# Patient Record
Sex: FEMALE | Race: WHITE | Employment: OTHER | URBAN - METROPOLITAN AREA
[De-identification: names, ages, dates, MRNs, and addresses within clinical notes are randomized per-mention and may not be internally consistent; named-entity substitution may affect disease eponyms.]

---

## 2017-03-01 ENCOUNTER — TRANSCRIBE ORDERS (OUTPATIENT)
Dept: ADMINISTRATIVE | Age: 82
End: 2017-03-01

## 2017-03-01 ENCOUNTER — APPOINTMENT (OUTPATIENT)
Dept: LAB | Age: 82
End: 2017-03-01
Payer: COMMERCIAL

## 2017-03-01 DIAGNOSIS — R73.01 IMPAIRED FASTING GLUCOSE: ICD-10-CM

## 2017-03-01 DIAGNOSIS — R53.81 OTHER MALAISE AND FATIGUE: ICD-10-CM

## 2017-03-01 DIAGNOSIS — R53.81 OTHER MALAISE AND FATIGUE: Primary | ICD-10-CM

## 2017-03-01 DIAGNOSIS — R53.83 OTHER MALAISE AND FATIGUE: ICD-10-CM

## 2017-03-01 DIAGNOSIS — R53.83 OTHER MALAISE AND FATIGUE: Primary | ICD-10-CM

## 2017-03-01 LAB
25(OH)D3 SERPL-MCNC: 42.8 NG/ML (ref 30–100)
ALBUMIN SERPL BCP-MCNC: 3.5 G/DL (ref 3.5–5)
ALP SERPL-CCNC: 70 U/L (ref 46–116)
ALT SERPL W P-5'-P-CCNC: 20 U/L (ref 12–78)
AMORPH PHOS CRY URNS QL MICRO: ABNORMAL /HPF
ANION GAP SERPL CALCULATED.3IONS-SCNC: 6 MMOL/L (ref 4–13)
AST SERPL W P-5'-P-CCNC: 14 U/L (ref 5–45)
BACTERIA UR QL AUTO: ABNORMAL /HPF
BASOPHILS # BLD AUTO: 0.02 THOUSANDS/ΜL (ref 0–0.1)
BASOPHILS NFR BLD AUTO: 0 % (ref 0–1)
BILIRUB SERPL-MCNC: 0.6 MG/DL (ref 0.2–1)
BILIRUB UR QL STRIP: NEGATIVE
BUN SERPL-MCNC: 14 MG/DL (ref 5–25)
CALCIUM SERPL-MCNC: 9 MG/DL (ref 8.3–10.1)
CHLORIDE SERPL-SCNC: 109 MMOL/L (ref 100–108)
CHOLEST SERPL-MCNC: 260 MG/DL (ref 50–200)
CLARITY UR: ABNORMAL
CO2 SERPL-SCNC: 28 MMOL/L (ref 21–32)
COLOR UR: YELLOW
CREAT SERPL-MCNC: 0.74 MG/DL (ref 0.6–1.3)
EOSINOPHIL # BLD AUTO: 0.24 THOUSAND/ΜL (ref 0–0.61)
EOSINOPHIL NFR BLD AUTO: 4 % (ref 0–6)
ERYTHROCYTE [DISTWIDTH] IN BLOOD BY AUTOMATED COUNT: 14.4 % (ref 11.6–15.1)
EST. AVERAGE GLUCOSE BLD GHB EST-MCNC: 114 MG/DL
GFR SERPL CREATININE-BSD FRML MDRD: >60 ML/MIN/1.73SQ M
GLUCOSE SERPL-MCNC: 92 MG/DL (ref 65–140)
GLUCOSE UR STRIP-MCNC: NEGATIVE MG/DL
HBA1C MFR BLD: 5.6 % (ref 4.2–6.3)
HCT VFR BLD AUTO: 40.4 % (ref 34.8–46.1)
HDLC SERPL-MCNC: 96 MG/DL (ref 40–60)
HGB BLD-MCNC: 13.6 G/DL (ref 11.5–15.4)
HGB UR QL STRIP.AUTO: ABNORMAL
KETONES UR STRIP-MCNC: NEGATIVE MG/DL
LDLC SERPL CALC-MCNC: 145 MG/DL (ref 0–100)
LEUKOCYTE ESTERASE UR QL STRIP: ABNORMAL
LYMPHOCYTES # BLD AUTO: 2.1 THOUSANDS/ΜL (ref 0.6–4.47)
LYMPHOCYTES NFR BLD AUTO: 35 % (ref 14–44)
MCH RBC QN AUTO: 30 PG (ref 26.8–34.3)
MCHC RBC AUTO-ENTMCNC: 33.7 G/DL (ref 31.4–37.4)
MCV RBC AUTO: 89 FL (ref 82–98)
MONOCYTES # BLD AUTO: 0.53 THOUSAND/ΜL (ref 0.17–1.22)
MONOCYTES NFR BLD AUTO: 9 % (ref 4–12)
NEUTROPHILS # BLD AUTO: 3.03 THOUSANDS/ΜL (ref 1.85–7.62)
NEUTS SEG NFR BLD AUTO: 52 % (ref 43–75)
NITRITE UR QL STRIP: NEGATIVE
NON-SQ EPI CELLS URNS QL MICRO: ABNORMAL /HPF
NRBC BLD AUTO-RTO: 0 /100 WBCS
PH UR STRIP.AUTO: 7.5 [PH] (ref 4.5–8)
PLATELET # BLD AUTO: 349 THOUSANDS/UL (ref 149–390)
PMV BLD AUTO: 10 FL (ref 8.9–12.7)
POTASSIUM SERPL-SCNC: 3.5 MMOL/L (ref 3.5–5.3)
PROT SERPL-MCNC: 7.1 G/DL (ref 6.4–8.2)
PROT UR STRIP-MCNC: NEGATIVE MG/DL
RBC # BLD AUTO: 4.54 MILLION/UL (ref 3.81–5.12)
RBC #/AREA URNS AUTO: ABNORMAL /HPF
SODIUM SERPL-SCNC: 143 MMOL/L (ref 136–145)
SP GR UR STRIP.AUTO: 1.01 (ref 1–1.03)
TRIGL SERPL-MCNC: 93 MG/DL
TSH SERPL DL<=0.05 MIU/L-ACNC: 2.89 UIU/ML (ref 0.36–3.74)
UROBILINOGEN UR QL STRIP.AUTO: 0.2 E.U./DL
WBC # BLD AUTO: 5.93 THOUSAND/UL (ref 4.31–10.16)
WBC #/AREA URNS AUTO: ABNORMAL /HPF

## 2017-03-01 PROCEDURE — 36415 COLL VENOUS BLD VENIPUNCTURE: CPT

## 2017-03-01 PROCEDURE — 80061 LIPID PANEL: CPT

## 2017-03-01 PROCEDURE — 81001 URINALYSIS AUTO W/SCOPE: CPT | Performed by: INTERNAL MEDICINE

## 2017-03-01 PROCEDURE — 80053 COMPREHEN METABOLIC PANEL: CPT

## 2017-03-01 PROCEDURE — 83036 HEMOGLOBIN GLYCOSYLATED A1C: CPT

## 2017-03-01 PROCEDURE — 84443 ASSAY THYROID STIM HORMONE: CPT

## 2017-03-01 PROCEDURE — 82306 VITAMIN D 25 HYDROXY: CPT

## 2017-03-01 PROCEDURE — 87086 URINE CULTURE/COLONY COUNT: CPT | Performed by: INTERNAL MEDICINE

## 2017-03-01 PROCEDURE — 85025 COMPLETE CBC W/AUTO DIFF WBC: CPT

## 2017-03-02 LAB — BACTERIA UR CULT: NORMAL

## 2017-06-05 ENCOUNTER — TRANSCRIBE ORDERS (OUTPATIENT)
Dept: ADMINISTRATIVE | Facility: HOSPITAL | Age: 82
End: 2017-06-05

## 2018-10-01 ENCOUNTER — APPOINTMENT (OUTPATIENT)
Dept: LAB | Age: 83
End: 2018-10-01
Payer: COMMERCIAL

## 2018-10-01 ENCOUNTER — TRANSCRIBE ORDERS (OUTPATIENT)
Dept: ADMINISTRATIVE | Age: 83
End: 2018-10-01

## 2018-10-01 ENCOUNTER — APPOINTMENT (OUTPATIENT)
Dept: RADIOLOGY | Age: 83
End: 2018-10-01
Payer: COMMERCIAL

## 2018-10-01 DIAGNOSIS — M25.511 CHRONIC RIGHT SHOULDER PAIN: ICD-10-CM

## 2018-10-01 DIAGNOSIS — G89.29 CHRONIC LEFT SHOULDER PAIN: ICD-10-CM

## 2018-10-01 DIAGNOSIS — E78.2 MIXED HYPERLIPIDEMIA: ICD-10-CM

## 2018-10-01 DIAGNOSIS — M25.512 CHRONIC LEFT SHOULDER PAIN: ICD-10-CM

## 2018-10-01 DIAGNOSIS — G89.29 CHRONIC RIGHT SHOULDER PAIN: ICD-10-CM

## 2018-10-01 DIAGNOSIS — E55.9 AVITAMINOSIS D: ICD-10-CM

## 2018-10-01 DIAGNOSIS — E78.2 MIXED HYPERLIPIDEMIA: Primary | ICD-10-CM

## 2018-10-01 LAB
25(OH)D3 SERPL-MCNC: 81.5 NG/ML (ref 30–100)
ALBUMIN SERPL BCP-MCNC: 3.5 G/DL (ref 3.5–5)
ALP SERPL-CCNC: 88 U/L (ref 46–116)
ALT SERPL W P-5'-P-CCNC: 18 U/L (ref 12–78)
ANION GAP SERPL CALCULATED.3IONS-SCNC: 5 MMOL/L (ref 4–13)
AST SERPL W P-5'-P-CCNC: 13 U/L (ref 5–45)
BASOPHILS # BLD AUTO: 0.06 THOUSANDS/ΜL (ref 0–0.1)
BASOPHILS NFR BLD AUTO: 1 % (ref 0–1)
BILIRUB SERPL-MCNC: 0.7 MG/DL (ref 0.2–1)
BUN SERPL-MCNC: 13 MG/DL (ref 5–25)
CALCIUM SERPL-MCNC: 9 MG/DL (ref 8.3–10.1)
CHLORIDE SERPL-SCNC: 105 MMOL/L (ref 100–108)
CHOLEST SERPL-MCNC: 217 MG/DL (ref 50–200)
CO2 SERPL-SCNC: 28 MMOL/L (ref 21–32)
CREAT SERPL-MCNC: 0.78 MG/DL (ref 0.6–1.3)
EOSINOPHIL # BLD AUTO: 0.29 THOUSAND/ΜL (ref 0–0.61)
EOSINOPHIL NFR BLD AUTO: 6 % (ref 0–6)
ERYTHROCYTE [DISTWIDTH] IN BLOOD BY AUTOMATED COUNT: 13.3 % (ref 11.6–15.1)
GFR SERPL CREATININE-BSD FRML MDRD: 72 ML/MIN/1.73SQ M
GLUCOSE P FAST SERPL-MCNC: 94 MG/DL (ref 65–99)
HCT VFR BLD AUTO: 43.6 % (ref 34.8–46.1)
HDLC SERPL-MCNC: 89 MG/DL (ref 40–60)
HGB BLD-MCNC: 13.7 G/DL (ref 11.5–15.4)
IMM GRANULOCYTES # BLD AUTO: 0.01 THOUSAND/UL (ref 0–0.2)
IMM GRANULOCYTES NFR BLD AUTO: 0 % (ref 0–2)
LDLC SERPL CALC-MCNC: 113 MG/DL (ref 0–100)
LYMPHOCYTES # BLD AUTO: 2.03 THOUSANDS/ΜL (ref 0.6–4.47)
LYMPHOCYTES NFR BLD AUTO: 39 % (ref 14–44)
MCH RBC QN AUTO: 29.1 PG (ref 26.8–34.3)
MCHC RBC AUTO-ENTMCNC: 31.4 G/DL (ref 31.4–37.4)
MCV RBC AUTO: 93 FL (ref 82–98)
MONOCYTES # BLD AUTO: 0.47 THOUSAND/ΜL (ref 0.17–1.22)
MONOCYTES NFR BLD AUTO: 9 % (ref 4–12)
NEUTROPHILS # BLD AUTO: 2.4 THOUSANDS/ΜL (ref 1.85–7.62)
NEUTS SEG NFR BLD AUTO: 45 % (ref 43–75)
NONHDLC SERPL-MCNC: 128 MG/DL
NRBC BLD AUTO-RTO: 0 /100 WBCS
PLATELET # BLD AUTO: 324 THOUSANDS/UL (ref 149–390)
PMV BLD AUTO: 9.7 FL (ref 8.9–12.7)
POTASSIUM SERPL-SCNC: 3.5 MMOL/L (ref 3.5–5.3)
PROT SERPL-MCNC: 7.4 G/DL (ref 6.4–8.2)
RBC # BLD AUTO: 4.71 MILLION/UL (ref 3.81–5.12)
SODIUM SERPL-SCNC: 138 MMOL/L (ref 136–145)
TRIGL SERPL-MCNC: 76 MG/DL
TSH SERPL DL<=0.05 MIU/L-ACNC: 2.56 UIU/ML (ref 0.36–3.74)
WBC # BLD AUTO: 5.26 THOUSAND/UL (ref 4.31–10.16)

## 2018-10-01 PROCEDURE — 85025 COMPLETE CBC W/AUTO DIFF WBC: CPT

## 2018-10-01 PROCEDURE — 80061 LIPID PANEL: CPT

## 2018-10-01 PROCEDURE — 73030 X-RAY EXAM OF SHOULDER: CPT

## 2018-10-01 PROCEDURE — 84443 ASSAY THYROID STIM HORMONE: CPT

## 2018-10-01 PROCEDURE — 36415 COLL VENOUS BLD VENIPUNCTURE: CPT

## 2018-10-01 PROCEDURE — 80053 COMPREHEN METABOLIC PANEL: CPT

## 2018-10-01 PROCEDURE — 82306 VITAMIN D 25 HYDROXY: CPT

## 2019-10-17 ENCOUNTER — TRANSCRIBE ORDERS (OUTPATIENT)
Dept: ADMINISTRATIVE | Age: 84
End: 2019-10-17

## 2019-10-17 ENCOUNTER — APPOINTMENT (OUTPATIENT)
Dept: LAB | Age: 84
End: 2019-10-17
Payer: COMMERCIAL

## 2019-10-17 DIAGNOSIS — E55.9 AVITAMINOSIS D: ICD-10-CM

## 2019-10-17 DIAGNOSIS — R73.01 IMPAIRED FASTING GLUCOSE: ICD-10-CM

## 2019-10-17 DIAGNOSIS — R73.01 IMPAIRED FASTING GLUCOSE: Primary | ICD-10-CM

## 2019-10-17 LAB
25(OH)D3 SERPL-MCNC: 63.6 NG/ML (ref 30–100)
ALBUMIN SERPL BCP-MCNC: 3.8 G/DL (ref 3.5–5)
ALP SERPL-CCNC: 92 U/L (ref 46–116)
ALT SERPL W P-5'-P-CCNC: 18 U/L (ref 12–78)
ANION GAP SERPL CALCULATED.3IONS-SCNC: 4 MMOL/L (ref 4–13)
AST SERPL W P-5'-P-CCNC: 13 U/L (ref 5–45)
BASOPHILS # BLD AUTO: 0.05 THOUSANDS/ΜL (ref 0–0.1)
BASOPHILS NFR BLD AUTO: 1 % (ref 0–1)
BILIRUB SERPL-MCNC: 0.62 MG/DL (ref 0.2–1)
BUN SERPL-MCNC: 15 MG/DL (ref 5–25)
CALCIUM SERPL-MCNC: 9.5 MG/DL (ref 8.3–10.1)
CHLORIDE SERPL-SCNC: 108 MMOL/L (ref 100–108)
CHOLEST SERPL-MCNC: 260 MG/DL (ref 50–200)
CO2 SERPL-SCNC: 28 MMOL/L (ref 21–32)
CREAT SERPL-MCNC: 0.71 MG/DL (ref 0.6–1.3)
EOSINOPHIL # BLD AUTO: 0.18 THOUSAND/ΜL (ref 0–0.61)
EOSINOPHIL NFR BLD AUTO: 3 % (ref 0–6)
ERYTHROCYTE [DISTWIDTH] IN BLOOD BY AUTOMATED COUNT: 14 % (ref 11.6–15.1)
EST. AVERAGE GLUCOSE BLD GHB EST-MCNC: 100 MG/DL
GFR SERPL CREATININE-BSD FRML MDRD: 80 ML/MIN/1.73SQ M
GLUCOSE P FAST SERPL-MCNC: 93 MG/DL (ref 65–99)
HBA1C MFR BLD: 5.1 % (ref 4.2–6.3)
HCT VFR BLD AUTO: 45 % (ref 34.8–46.1)
HDLC SERPL-MCNC: 84 MG/DL (ref 40–60)
HGB BLD-MCNC: 14.3 G/DL (ref 11.5–15.4)
IMM GRANULOCYTES # BLD AUTO: 0.02 THOUSAND/UL (ref 0–0.2)
IMM GRANULOCYTES NFR BLD AUTO: 0 % (ref 0–2)
LDLC SERPL CALC-MCNC: 159 MG/DL (ref 0–100)
LYMPHOCYTES # BLD AUTO: 2.3 THOUSANDS/ΜL (ref 0.6–4.47)
LYMPHOCYTES NFR BLD AUTO: 40 % (ref 14–44)
MCH RBC QN AUTO: 28.7 PG (ref 26.8–34.3)
MCHC RBC AUTO-ENTMCNC: 31.8 G/DL (ref 31.4–37.4)
MCV RBC AUTO: 90 FL (ref 82–98)
MONOCYTES # BLD AUTO: 0.53 THOUSAND/ΜL (ref 0.17–1.22)
MONOCYTES NFR BLD AUTO: 9 % (ref 4–12)
NEUTROPHILS # BLD AUTO: 2.66 THOUSANDS/ΜL (ref 1.85–7.62)
NEUTS SEG NFR BLD AUTO: 47 % (ref 43–75)
NONHDLC SERPL-MCNC: 176 MG/DL
NRBC BLD AUTO-RTO: 0 /100 WBCS
PLATELET # BLD AUTO: 359 THOUSANDS/UL (ref 149–390)
PMV BLD AUTO: 9.8 FL (ref 8.9–12.7)
POTASSIUM SERPL-SCNC: 3.8 MMOL/L (ref 3.5–5.3)
PROT SERPL-MCNC: 7.4 G/DL (ref 6.4–8.2)
RBC # BLD AUTO: 4.98 MILLION/UL (ref 3.81–5.12)
SODIUM SERPL-SCNC: 140 MMOL/L (ref 136–145)
TRIGL SERPL-MCNC: 87 MG/DL
TSH SERPL DL<=0.05 MIU/L-ACNC: 2.85 UIU/ML (ref 0.36–3.74)
WBC # BLD AUTO: 5.74 THOUSAND/UL (ref 4.31–10.16)

## 2019-10-17 PROCEDURE — 80053 COMPREHEN METABOLIC PANEL: CPT

## 2019-10-17 PROCEDURE — 84443 ASSAY THYROID STIM HORMONE: CPT

## 2019-10-17 PROCEDURE — 83036 HEMOGLOBIN GLYCOSYLATED A1C: CPT

## 2019-10-17 PROCEDURE — 85025 COMPLETE CBC W/AUTO DIFF WBC: CPT

## 2019-10-17 PROCEDURE — 36415 COLL VENOUS BLD VENIPUNCTURE: CPT

## 2019-10-17 PROCEDURE — 82306 VITAMIN D 25 HYDROXY: CPT

## 2019-10-17 PROCEDURE — 80061 LIPID PANEL: CPT

## 2020-09-14 ENCOUNTER — TELEPHONE (OUTPATIENT)
Dept: INTERNAL MEDICINE CLINIC | Facility: CLINIC | Age: 85
End: 2020-09-14

## 2020-09-14 DIAGNOSIS — E55.9 VITAMIN D DEFICIENCY DISEASE: Primary | ICD-10-CM

## 2020-09-14 DIAGNOSIS — R73.01 IMPAIRED FASTING GLUCOSE: ICD-10-CM

## 2020-09-14 DIAGNOSIS — E78.2 MIXED HYPERLIPIDEMIA: ICD-10-CM

## 2020-09-14 NOTE — TELEPHONE ENCOUNTER
Lab in computer, have her do 3 days before apt, if she goes to [facility] , she does not need paper

## 2020-09-29 ENCOUNTER — OFFICE VISIT (OUTPATIENT)
Dept: INTERNAL MEDICINE CLINIC | Facility: CLINIC | Age: 85
End: 2020-09-29
Payer: COMMERCIAL

## 2020-09-29 VITALS
WEIGHT: 142 LBS | TEMPERATURE: 97.6 F | SYSTOLIC BLOOD PRESSURE: 148 MMHG | HEIGHT: 65 IN | HEART RATE: 92 BPM | DIASTOLIC BLOOD PRESSURE: 82 MMHG | BODY MASS INDEX: 23.66 KG/M2

## 2020-09-29 DIAGNOSIS — R73.01 IMPAIRED FASTING GLUCOSE: ICD-10-CM

## 2020-09-29 DIAGNOSIS — E55.9 VITAMIN D DEFICIENCY DISEASE: ICD-10-CM
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
## 2020-09-29 DIAGNOSIS — R42 DIZZINESS: ICD-10-CM

## 2020-09-29 DIAGNOSIS — E78.2 MIXED HYPERLIPIDEMIA: ICD-10-CM

## 2020-09-29 DIAGNOSIS — M12.812 ROTATOR CUFF ARTHROPATHY, LEFT: ICD-10-CM

## 2020-09-29 PROCEDURE — 3725F SCREEN DEPRESSION PERFORMED: CPT | Performed by: INTERNAL MEDICINE

## 2020-09-29 PROCEDURE — 99214 OFFICE O/P EST MOD 30 MIN: CPT | Performed by: INTERNAL MEDICINE

## 2020-09-29 RX ORDER — SENNOSIDES 8.6 MG
650 CAPSULE ORAL EVERY 8 HOURS PRN
Qty: 30 TABLET | Refills: 0 | Status: SHIPPED | OUTPATIENT
Start: 2020-09-29 | End: 2021-03-24 | Stop reason: SDUPTHER

## 2020-09-29 RX ORDER — ERGOCALCIFEROL (VITAMIN D2) 1250 MCG
50000 CAPSULE ORAL WEEKLY
COMMUNITY
End: 2020-09-29 | Stop reason: SDUPTHER

## 2020-09-29 RX ORDER — MECLIZINE HYDROCHLORIDE 25 MG/1
25 TABLET ORAL EVERY 8 HOURS PRN
Qty: 30 TABLET | Refills: 0 | Status: SHIPPED | OUTPATIENT
Start: 2020-09-29 | End: 2020-10-08

## 2020-09-29 RX ORDER — UBIDECARENONE 75 MG
100 CAPSULE ORAL
COMMUNITY
End: 2021-08-25

## 2020-09-29 RX ORDER — LEVALBUTEROL INHALATION SOLUTION 0.63 MG/3ML
0.63 SOLUTION RESPIRATORY (INHALATION) EVERY 6 HOURS PRN
COMMUNITY
End: 2021-08-25

## 2020-09-29 RX ORDER — ERGOCALCIFEROL (VITAMIN D2) 1250 MCG
50000 CAPSULE ORAL WEEKLY
Qty: 12 CAPSULE | Refills: 1 | Status: SHIPPED | OUTPATIENT
Start: 2020-09-29 | End: 2021-03-10

## 2020-09-29 NOTE — PROGRESS NOTES
Assessment/Plan:             1  Fall (on) (from) other stairs and steps, initial encounter  -     acetaminophen (TYLENOL) 650 mg CR tablet; Take 1 tablet (650 mg total) by mouth every 8 (eight) hours as needed for mild pain    2  Impaired fasting glucose    3  Vitamin D deficiency disease  -     ergocalciferol (ERGOCALCIFEROL) 1 25 MG (01329 UT) capsule; Take 1 capsule (50,000 Units total) by mouth once a week    4  Mixed hyperlipidemia    5  Rotator cuff arthropathy, left    6  Dizziness  -     meclizine (ANTIVERT) 25 mg tablet; Take 1 tablet (25 mg total) by mouth every 8 (eight) hours as needed for dizziness           Subjective:      Patient ID: Chapo Boyd is a 82 y o  female  Had fall yesterday, missed step, no loss of consciousness, bilateral knee pain, left foot pain, left elbow pain, left shoulder pain got worse, no headaches, no chest pain no shortness of breath      The following portions of the patient's history were reviewed and updated as appropriate: She  has no past medical history on file  She   Patient Active Problem List    Diagnosis Date Noted    Rotator cuff arthropathy, left 09/29/2020    Fall (on) (from) other stairs and steps, initial encounter 09/29/2020    Vitamin D deficiency disease 09/14/2020    Mixed hyperlipidemia 09/14/2020    Impaired fasting glucose 09/14/2020     She  has no past surgical history on file  Her family history is not on file  She  reports that she has quit smoking  She has never used smokeless tobacco  She reports previous alcohol use  No history on file for drug    Current Outpatient Medications   Medication Sig Dispense Refill    cyanocobalamin (VITAMIN B-12) 100 mcg tablet Take 100 tablets by mouth      ergocalciferol (ERGOCALCIFEROL) 1 25 MG (41036 UT) capsule Take 1 capsule (50,000 Units total) by mouth once a week 12 capsule 1    levalbuterol (XOPENEX) 0 63 mg/3 mL nebulizer solution Take 0 63 mg by nebulization every 6 (six) hours as needed for wheezing or shortness of breath      acetaminophen (TYLENOL) 650 mg CR tablet Take 1 tablet (650 mg total) by mouth every 8 (eight) hours as needed for mild pain 30 tablet 0    meclizine (ANTIVERT) 25 mg tablet Take 1 tablet (25 mg total) by mouth every 8 (eight) hours as needed for dizziness 30 tablet 0     No current facility-administered medications for this visit  Current Outpatient Medications on File Prior to Visit   Medication Sig    cyanocobalamin (VITAMIN B-12) 100 mcg tablet Take 100 tablets by mouth    levalbuterol (XOPENEX) 0 63 mg/3 mL nebulizer solution Take 0 63 mg by nebulization every 6 (six) hours as needed for wheezing or shortness of breath    [DISCONTINUED] ergocalciferol (ERGOCALCIFEROL) 1 25 MG (51762 UT) capsule Take 50,000 Units by mouth once a week     No current facility-administered medications on file prior to visit  She is allergic to biaxin [clarithromycin]; clindamycin; and iodine       Review of Systems   Constitutional: Negative for chills and fever  HENT: Negative for congestion, ear pain and sore throat  Eyes: Negative for pain  Respiratory: Negative for cough and shortness of breath  Cardiovascular: Negative for chest pain and leg swelling  Gastrointestinal: Negative for abdominal pain, nausea and vomiting  Endocrine: Negative for polyuria  Genitourinary: Negative for difficulty urinating, frequency and urgency  Musculoskeletal: Positive for arthralgias and back pain  Skin: Negative for rash  Neurological: Negative for weakness and headaches  Psychiatric/Behavioral: Negative for sleep disturbance  The patient is not nervous/anxious  Objective:      /82 (BP Location: Left arm, Patient Position: Standing, Cuff Size: Adult)   Pulse 92   Temp 97 6 °F (36 4 °C) (Temporal)   Ht 5' 5" (1 651 m)   Wt 64 4 kg (142 lb)   BMI 23 63 kg/m²     No results found for this or any previous visit (from the past 1344 hour(s))       Physical Exam  Constitutional:       Appearance: Normal appearance  HENT:      Head: Normocephalic  Right Ear: Tympanic membrane, ear canal and external ear normal       Left Ear: Tympanic membrane, ear canal and external ear normal       Nose: Nose normal  No congestion  Mouth/Throat:      Mouth: Mucous membranes are moist       Pharynx: Oropharynx is clear  No oropharyngeal exudate or posterior oropharyngeal erythema  Eyes:      Extraocular Movements: Extraocular movements intact  Conjunctiva/sclera: Conjunctivae normal       Pupils: Pupils are equal, round, and reactive to light  Neck:      Musculoskeletal: Normal range of motion and neck supple  Cardiovascular:      Rate and Rhythm: Normal rate and regular rhythm  Heart sounds: Normal heart sounds  No murmur  Pulmonary:      Effort: Pulmonary effort is normal       Breath sounds: Normal breath sounds  No wheezing or rales  Abdominal:      General: Bowel sounds are normal  There is no distension  Palpations: Abdomen is soft  Tenderness: There is no abdominal tenderness  Musculoskeletal: Normal range of motion  Right lower leg: No edema  Left lower leg: No edema  Comments: Bilateral knee exam-skin looks normal, no swelling, minimal tenderness on the joint line, movement crepitation present mild pain  Left shoulder exam-movement painful and restricted    Left foot at the metatarsophalangeal joint of the big toe skin mild bruise present, mild tenderness present, minimal swelling   Lymphadenopathy:      Cervical: No cervical adenopathy  Skin:     General: Skin is warm  Neurological:      General: No focal deficit present  Mental Status: She is alert and oriented to person, place, and time

## 2020-10-08 DIAGNOSIS — R42 DIZZINESS: ICD-10-CM

## 2020-10-08 RX ORDER — MECLIZINE HYDROCHLORIDE 25 MG/1
TABLET ORAL
Qty: 30 TABLET | Refills: 0 | Status: SHIPPED | OUTPATIENT
Start: 2020-10-08 | End: 2020-10-22

## 2020-10-16 ENCOUNTER — LAB (OUTPATIENT)
Dept: LAB | Age: 85
End: 2020-10-16
Payer: COMMERCIAL

## 2020-10-16 DIAGNOSIS — R73.01 IMPAIRED FASTING GLUCOSE: ICD-10-CM

## 2020-10-16 DIAGNOSIS — E78.2 MIXED HYPERLIPIDEMIA: ICD-10-CM

## 2020-10-16 DIAGNOSIS — E55.9 VITAMIN D DEFICIENCY DISEASE: ICD-10-CM

## 2020-10-16 LAB
25(OH)D3 SERPL-MCNC: 61.1 NG/ML (ref 30–100)
ALBUMIN SERPL BCP-MCNC: 4 G/DL (ref 3.5–5)
ALP SERPL-CCNC: 91 U/L (ref 46–116)
ALT SERPL W P-5'-P-CCNC: 26 U/L (ref 12–78)
ANION GAP SERPL CALCULATED.3IONS-SCNC: 3 MMOL/L (ref 4–13)
AST SERPL W P-5'-P-CCNC: 19 U/L (ref 5–45)
BASOPHILS # BLD AUTO: 0.05 THOUSANDS/ΜL (ref 0–0.1)
BASOPHILS NFR BLD AUTO: 1 % (ref 0–1)
BILIRUB SERPL-MCNC: 0.61 MG/DL (ref 0.2–1)
BUN SERPL-MCNC: 13 MG/DL (ref 5–25)
CALCIUM SERPL-MCNC: 9.9 MG/DL (ref 8.3–10.1)
CHLORIDE SERPL-SCNC: 110 MMOL/L (ref 100–108)
CHOLEST SERPL-MCNC: 278 MG/DL (ref 50–200)
CO2 SERPL-SCNC: 29 MMOL/L (ref 21–32)
CREAT SERPL-MCNC: 0.8 MG/DL (ref 0.6–1.3)
EOSINOPHIL # BLD AUTO: 0.21 THOUSAND/ΜL (ref 0–0.61)
EOSINOPHIL NFR BLD AUTO: 4 % (ref 0–6)
ERYTHROCYTE [DISTWIDTH] IN BLOOD BY AUTOMATED COUNT: 13.7 % (ref 11.6–15.1)
EST. AVERAGE GLUCOSE BLD GHB EST-MCNC: 105 MG/DL
GFR SERPL CREATININE-BSD FRML MDRD: 69 ML/MIN/1.73SQ M
GLUCOSE P FAST SERPL-MCNC: 100 MG/DL (ref 65–99)
HBA1C MFR BLD: 5.3 %
HCT VFR BLD AUTO: 45.6 % (ref 34.8–46.1)
HDLC SERPL-MCNC: 99 MG/DL
HGB BLD-MCNC: 14.8 G/DL (ref 11.5–15.4)
IMM GRANULOCYTES # BLD AUTO: 0.01 THOUSAND/UL (ref 0–0.2)
IMM GRANULOCYTES NFR BLD AUTO: 0 % (ref 0–2)
LDLC SERPL CALC-MCNC: 161 MG/DL (ref 0–100)
LYMPHOCYTES # BLD AUTO: 2.44 THOUSANDS/ΜL (ref 0.6–4.47)
LYMPHOCYTES NFR BLD AUTO: 43 % (ref 14–44)
MCH RBC QN AUTO: 29.6 PG (ref 26.8–34.3)
MCHC RBC AUTO-ENTMCNC: 32.5 G/DL (ref 31.4–37.4)
MCV RBC AUTO: 91 FL (ref 82–98)
MONOCYTES # BLD AUTO: 0.47 THOUSAND/ΜL (ref 0.17–1.22)
MONOCYTES NFR BLD AUTO: 8 % (ref 4–12)
NEUTROPHILS # BLD AUTO: 2.48 THOUSANDS/ΜL (ref 1.85–7.62)
NEUTS SEG NFR BLD AUTO: 44 % (ref 43–75)
NRBC BLD AUTO-RTO: 0 /100 WBCS
PLATELET # BLD AUTO: 365 THOUSANDS/UL (ref 149–390)
PMV BLD AUTO: 9.5 FL (ref 8.9–12.7)
POTASSIUM SERPL-SCNC: 3.6 MMOL/L (ref 3.5–5.3)
PROT SERPL-MCNC: 8 G/DL (ref 6.4–8.2)
RBC # BLD AUTO: 5 MILLION/UL (ref 3.81–5.12)
SODIUM SERPL-SCNC: 142 MMOL/L (ref 136–145)
TRIGL SERPL-MCNC: 88 MG/DL
TSH SERPL DL<=0.05 MIU/L-ACNC: 2.73 UIU/ML (ref 0.36–3.74)
WBC # BLD AUTO: 5.66 THOUSAND/UL (ref 4.31–10.16)

## 2020-10-16 PROCEDURE — 80053 COMPREHEN METABOLIC PANEL: CPT

## 2020-10-16 PROCEDURE — 36415 COLL VENOUS BLD VENIPUNCTURE: CPT

## 2020-10-16 PROCEDURE — 83036 HEMOGLOBIN GLYCOSYLATED A1C: CPT

## 2020-10-16 PROCEDURE — 84443 ASSAY THYROID STIM HORMONE: CPT

## 2020-10-16 PROCEDURE — 80061 LIPID PANEL: CPT

## 2020-10-16 PROCEDURE — 82306 VITAMIN D 25 HYDROXY: CPT

## 2020-10-16 PROCEDURE — 85025 COMPLETE CBC W/AUTO DIFF WBC: CPT

## 2020-10-20 RX ORDER — ALPRAZOLAM 0.25 MG/1
TABLET ORAL EVERY 8 HOURS
COMMUNITY
End: 2020-10-21

## 2020-10-20 RX ORDER — MECLIZINE HCL 12.5 MG/1
TABLET ORAL 3 TIMES DAILY PRN
COMMUNITY
End: 2020-10-21

## 2020-10-21 ENCOUNTER — OFFICE VISIT (OUTPATIENT)
Dept: INTERNAL MEDICINE CLINIC | Facility: CLINIC | Age: 85
End: 2020-10-21
Payer: COMMERCIAL

## 2020-10-21 VITALS
HEIGHT: 65 IN | WEIGHT: 144 LBS | HEART RATE: 94 BPM | BODY MASS INDEX: 23.99 KG/M2 | TEMPERATURE: 98.1 F | DIASTOLIC BLOOD PRESSURE: 86 MMHG | SYSTOLIC BLOOD PRESSURE: 152 MMHG

## 2020-10-21 DIAGNOSIS — R73.01 IMPAIRED FASTING GLUCOSE: Primary | ICD-10-CM

## 2020-10-21 DIAGNOSIS — E55.9 VITAMIN D DEFICIENCY DISEASE: ICD-10-CM

## 2020-10-21 DIAGNOSIS — M12.812 ROTATOR CUFF ARTHROPATHY, LEFT: ICD-10-CM

## 2020-10-21 DIAGNOSIS — E78.2 MIXED HYPERLIPIDEMIA: ICD-10-CM

## 2020-10-21 DIAGNOSIS — R42 DIZZINESS: ICD-10-CM

## 2020-10-21 DIAGNOSIS — J45.20 MILD INTERMITTENT ASTHMA WITHOUT COMPLICATION: ICD-10-CM

## 2020-10-21 PROCEDURE — 3288F FALL RISK ASSESSMENT DOCD: CPT | Performed by: INTERNAL MEDICINE

## 2020-10-21 PROCEDURE — 1160F RVW MEDS BY RX/DR IN RCRD: CPT | Performed by: INTERNAL MEDICINE

## 2020-10-21 PROCEDURE — 1100F PTFALLS ASSESS-DOCD GE2>/YR: CPT | Performed by: INTERNAL MEDICINE

## 2020-10-21 PROCEDURE — 99214 OFFICE O/P EST MOD 30 MIN: CPT | Performed by: INTERNAL MEDICINE

## 2020-10-21 PROCEDURE — 1036F TOBACCO NON-USER: CPT | Performed by: INTERNAL MEDICINE

## 2020-10-22 RX ORDER — MECLIZINE HYDROCHLORIDE 25 MG/1
TABLET ORAL
Qty: 30 TABLET | Refills: 0 | Status: SHIPPED | OUTPATIENT
Start: 2020-10-22 | End: 2022-03-23 | Stop reason: SDUPTHER

## 2020-10-23 ENCOUNTER — CLINICAL SUPPORT (OUTPATIENT)
Dept: INTERNAL MEDICINE CLINIC | Facility: CLINIC | Age: 85
End: 2020-10-23
Payer: COMMERCIAL

## 2020-10-23 DIAGNOSIS — Z23 NEED FOR INFLUENZA VACCINATION: Primary | ICD-10-CM

## 2020-10-23 PROCEDURE — 90662 IIV NO PRSV INCREASED AG IM: CPT

## 2020-10-23 PROCEDURE — G0008 ADMIN INFLUENZA VIRUS VAC: HCPCS

## 2021-01-20 DIAGNOSIS — Z23 ENCOUNTER FOR IMMUNIZATION: ICD-10-CM

## 2021-01-23 ENCOUNTER — IMMUNIZATIONS (OUTPATIENT)
Dept: FAMILY MEDICINE CLINIC | Facility: HOSPITAL | Age: 86
End: 2021-01-23

## 2021-01-23 DIAGNOSIS — Z23 ENCOUNTER FOR IMMUNIZATION: Primary | ICD-10-CM

## 2021-01-23 PROCEDURE — 0001A SARS-COV-2 / COVID-19 MRNA VACCINE (PFIZER-BIONTECH) 30 MCG: CPT

## 2021-01-23 PROCEDURE — 91300 SARS-COV-2 / COVID-19 MRNA VACCINE (PFIZER-BIONTECH) 30 MCG: CPT

## 2021-02-12 ENCOUNTER — IMMUNIZATIONS (OUTPATIENT)
Dept: FAMILY MEDICINE CLINIC | Facility: HOSPITAL | Age: 86
End: 2021-02-12

## 2021-02-12 DIAGNOSIS — Z23 ENCOUNTER FOR IMMUNIZATION: Primary | ICD-10-CM

## 2021-02-12 PROCEDURE — 91300 SARS-COV-2 / COVID-19 MRNA VACCINE (PFIZER-BIONTECH) 30 MCG: CPT

## 2021-02-12 PROCEDURE — 0002A SARS-COV-2 / COVID-19 MRNA VACCINE (PFIZER-BIONTECH) 30 MCG: CPT

## 2021-03-10 DIAGNOSIS — E55.9 VITAMIN D DEFICIENCY DISEASE: ICD-10-CM

## 2021-03-10 RX ORDER — ERGOCALCIFEROL 1.25 MG/1
CAPSULE ORAL
Qty: 12 CAPSULE | Refills: 1 | Status: SHIPPED | OUTPATIENT
Start: 2021-03-10 | End: 2021-08-30

## 2021-03-17 ENCOUNTER — RA CDI HCC (OUTPATIENT)
Dept: OTHER | Facility: HOSPITAL | Age: 86
End: 2021-03-17

## 2021-03-17 NOTE — PROGRESS NOTES
[facility]  coding oppertunities          Chart reviewed, no opportunity found: CHART REVIEWED, NO OPPORTUNITY FOUND

## 2021-03-19 DIAGNOSIS — E78.2 MIXED HYPERLIPIDEMIA: ICD-10-CM

## 2021-03-19 DIAGNOSIS — E55.9 VITAMIN D DEFICIENCY DISEASE: Primary | ICD-10-CM

## 2021-03-19 DIAGNOSIS — R73.01 IMPAIRED FASTING GLUCOSE: ICD-10-CM

## 2021-03-20 ENCOUNTER — APPOINTMENT (OUTPATIENT)
Dept: LAB | Age: 86
End: 2021-03-20
Payer: COMMERCIAL

## 2021-03-20 DIAGNOSIS — E78.2 MIXED HYPERLIPIDEMIA: ICD-10-CM

## 2021-03-20 DIAGNOSIS — E55.9 VITAMIN D DEFICIENCY DISEASE: ICD-10-CM

## 2021-03-20 DIAGNOSIS — R73.01 IMPAIRED FASTING GLUCOSE: ICD-10-CM

## 2021-03-20 LAB
25(OH)D3 SERPL-MCNC: 83.1 NG/ML (ref 30–100)
ALBUMIN SERPL BCP-MCNC: 4 G/DL (ref 3.5–5)
ALP SERPL-CCNC: 94 U/L (ref 46–116)
ALT SERPL W P-5'-P-CCNC: 23 U/L (ref 12–78)
ANION GAP SERPL CALCULATED.3IONS-SCNC: 6 MMOL/L (ref 4–13)
AST SERPL W P-5'-P-CCNC: 12 U/L (ref 5–45)
BASOPHILS # BLD AUTO: 0.06 THOUSANDS/ΜL (ref 0–0.1)
BASOPHILS NFR BLD AUTO: 1 % (ref 0–1)
BILIRUB SERPL-MCNC: 0.62 MG/DL (ref 0.2–1)
BUN SERPL-MCNC: 16 MG/DL (ref 5–25)
CALCIUM SERPL-MCNC: 9.4 MG/DL (ref 8.3–10.1)
CHLORIDE SERPL-SCNC: 107 MMOL/L (ref 100–108)
CHOLEST SERPL-MCNC: 289 MG/DL (ref 50–200)
CO2 SERPL-SCNC: 28 MMOL/L (ref 21–32)
CREAT SERPL-MCNC: 0.89 MG/DL (ref 0.6–1.3)
EOSINOPHIL # BLD AUTO: 0.15 THOUSAND/ΜL (ref 0–0.61)
EOSINOPHIL NFR BLD AUTO: 3 % (ref 0–6)
ERYTHROCYTE [DISTWIDTH] IN BLOOD BY AUTOMATED COUNT: 13.5 % (ref 11.6–15.1)
EST. AVERAGE GLUCOSE BLD GHB EST-MCNC: 105 MG/DL
GFR SERPL CREATININE-BSD FRML MDRD: 60 ML/MIN/1.73SQ M
GLUCOSE P FAST SERPL-MCNC: 104 MG/DL (ref 65–99)
HBA1C MFR BLD: 5.3 %
HCT VFR BLD AUTO: 45.5 % (ref 34.8–46.1)
HDLC SERPL-MCNC: 97 MG/DL
HGB BLD-MCNC: 14.5 G/DL (ref 11.5–15.4)
IMM GRANULOCYTES # BLD AUTO: 0.03 THOUSAND/UL (ref 0–0.2)
IMM GRANULOCYTES NFR BLD AUTO: 1 % (ref 0–2)
LDLC SERPL CALC-MCNC: 176 MG/DL (ref 0–100)
LYMPHOCYTES # BLD AUTO: 2.22 THOUSANDS/ΜL (ref 0.6–4.47)
LYMPHOCYTES NFR BLD AUTO: 39 % (ref 14–44)
MCH RBC QN AUTO: 29.1 PG (ref 26.8–34.3)
MCHC RBC AUTO-ENTMCNC: 31.9 G/DL (ref 31.4–37.4)
MCV RBC AUTO: 91 FL (ref 82–98)
MONOCYTES # BLD AUTO: 0.56 THOUSAND/ΜL (ref 0.17–1.22)
MONOCYTES NFR BLD AUTO: 10 % (ref 4–12)
NEUTROPHILS # BLD AUTO: 2.75 THOUSANDS/ΜL (ref 1.85–7.62)
NEUTS SEG NFR BLD AUTO: 46 % (ref 43–75)
NRBC BLD AUTO-RTO: 0 /100 WBCS
PLATELET # BLD AUTO: 358 THOUSANDS/UL (ref 149–390)
PMV BLD AUTO: 9.7 FL (ref 8.9–12.7)
POTASSIUM SERPL-SCNC: 3.7 MMOL/L (ref 3.5–5.3)
PROT SERPL-MCNC: 7.8 G/DL (ref 6.4–8.2)
RBC # BLD AUTO: 4.98 MILLION/UL (ref 3.81–5.12)
SODIUM SERPL-SCNC: 141 MMOL/L (ref 136–145)
TRIGL SERPL-MCNC: 82 MG/DL
TSH SERPL DL<=0.05 MIU/L-ACNC: 3.18 UIU/ML (ref 0.36–3.74)
WBC # BLD AUTO: 5.77 THOUSAND/UL (ref 4.31–10.16)

## 2021-03-20 PROCEDURE — 80061 LIPID PANEL: CPT

## 2021-03-20 PROCEDURE — 36415 COLL VENOUS BLD VENIPUNCTURE: CPT

## 2021-03-20 PROCEDURE — 80053 COMPREHEN METABOLIC PANEL: CPT

## 2021-03-20 PROCEDURE — 83036 HEMOGLOBIN GLYCOSYLATED A1C: CPT

## 2021-03-20 PROCEDURE — 84443 ASSAY THYROID STIM HORMONE: CPT

## 2021-03-20 PROCEDURE — 85025 COMPLETE CBC W/AUTO DIFF WBC: CPT

## 2021-03-20 PROCEDURE — 82306 VITAMIN D 25 HYDROXY: CPT

## 2021-03-24 ENCOUNTER — OFFICE VISIT (OUTPATIENT)
Dept: INTERNAL MEDICINE CLINIC | Facility: CLINIC | Age: 86
End: 2021-03-24
Payer: COMMERCIAL

## 2021-03-24 VITALS
HEIGHT: 65 IN | BODY MASS INDEX: 24.49 KG/M2 | HEART RATE: 91 BPM | DIASTOLIC BLOOD PRESSURE: 86 MMHG | TEMPERATURE: 97.9 F | OXYGEN SATURATION: 97 % | SYSTOLIC BLOOD PRESSURE: 148 MMHG | WEIGHT: 147 LBS

## 2021-03-24 DIAGNOSIS — G57.62 MORTON NEUROMA, LEFT: ICD-10-CM

## 2021-03-24 DIAGNOSIS — E55.9 VITAMIN D DEFICIENCY DISEASE: ICD-10-CM

## 2021-03-24 DIAGNOSIS — Z12.11 SCREENING FOR MALIGNANT NEOPLASM OF COLON: ICD-10-CM

## 2021-03-24 DIAGNOSIS — R73.01 IMPAIRED FASTING GLUCOSE: ICD-10-CM

## 2021-03-24 DIAGNOSIS — E78.2 MIXED HYPERLIPIDEMIA: Primary | ICD-10-CM

## 2021-03-24 DIAGNOSIS — Z53.20 COLON CANCER SCREENING DECLINED: ICD-10-CM
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
## 2021-03-24 PROCEDURE — 1160F RVW MEDS BY RX/DR IN RCRD: CPT | Performed by: INTERNAL MEDICINE

## 2021-03-24 PROCEDURE — 3725F SCREEN DEPRESSION PERFORMED: CPT | Performed by: INTERNAL MEDICINE

## 2021-03-24 PROCEDURE — 1036F TOBACCO NON-USER: CPT | Performed by: INTERNAL MEDICINE

## 2021-03-24 PROCEDURE — 99214 OFFICE O/P EST MOD 30 MIN: CPT | Performed by: INTERNAL MEDICINE

## 2021-03-24 RX ORDER — SENNOSIDES 8.6 MG
650 CAPSULE ORAL EVERY 8 HOURS PRN
Qty: 60 TABLET | Refills: 1 | Status: SHIPPED | OUTPATIENT
Start: 2021-03-24

## 2021-03-24 NOTE — PROGRESS NOTES
Assessment/Plan:      Falls Plan of Care: balance, strength, and gait training instructions were provided  1  Mixed hyperlipidemia    2  Impaired fasting glucose    3  Vitamin D deficiency disease    4  Martinez neuroma, left    5  Screening for malignant neoplasm of colon  -     Ambulatory referral for colonoscopy; Future    6  Fall (on) (from) other stairs and steps, initial encounter  -     acetaminophen (TYLENOL) 650 mg CR tablet; Take 1 tablet (650 mg total) by mouth every 8 (eight) hours as needed for mild pain She prefers capsule, please give her capsules  7  Colon cancer screening declined           Subjective:      Patient ID: Bettina Thayer is a 82 y o  female  Follow-up on blood test done on 03/20/2021-discussed with her, left foot pain, at the big toe area, sharp, had a fall last September 2020      The following portions of the patient's history were reviewed and updated as appropriate: She  has a past medical history of Abnormal weight loss, Allergic rhinitis, Allergic rhinitis due to allergen, Anxiety disorder, Asthma, Body mass index (BMI) 22 0-22 9, adult (01/25/2017), Body mass index (BMI) 23 0-23 9, adult (08/16/2017), Body mass index (BMI) 24 0-24 9, adult (11/23/2015), COPD (chronic obstructive pulmonary disease) (HCC), Crushing injury of multiple sites of trunk, Disorder of rotator cuff, left, Disorder of sacrum, Elevated blood pressure reading without diagnosis of hypertension, Enthesopathy, Hypercholesterolemia, Hyperlipemia, Hyperlipidemia, unspecified, Hypertension, Impaired fasting glucose, Insomnia, Loss of weight, Osteoarthritis, Osteoporosis, Other asthma, Sacrococcygeal disorders, not elsewhere classified, Shingles (11/07/2012), Unspecified osteoarthritis, unspecified site, and Vitamin D deficiency    She   Patient Active Problem List    Diagnosis Date Noted   Tony Faustino neuroma, 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    Screening for malignant neoplasm of colon 03/24/2021    Colon cancer screening declined 03/24/2021    Mild intermittent asthma without complication 41/29/2756    Rotator cuff arthropathy, left 09/29/2020    Fall (on) (from) other stairs and steps, initial encounter 09/29/2020    Vitamin D deficiency disease 09/14/2020    Mixed hyperlipidemia 09/14/2020    Impaired fasting glucose 09/14/2020     She  has a past surgical history that includes Sinus surgery and Colonoscopy (01/23/2009)  Her family history is not on file  She  reports that she has quit smoking  She has never used smokeless tobacco  She reports previous alcohol use  No history on file for drug  Current Outpatient Medications   Medication Sig Dispense Refill    acetaminophen (TYLENOL) 650 mg CR tablet Take 1 tablet (650 mg total) by mouth every 8 (eight) hours as needed for mild pain She prefers capsule, please give her capsules  60 tablet 1    cyanocobalamin (VITAMIN B-12) 100 mcg tablet Take 100 tablets by mouth      ergocalciferol (VITAMIN D2) 50,000 units take 1 capsule by mouth every week 12 capsule 1    meclizine (ANTIVERT) 25 mg tablet take 1 tablet by mouth every 8 hours if needed for dizziness 30 tablet 0    levalbuterol (XOPENEX) 0 63 mg/3 mL nebulizer solution Take 0 63 mg by nebulization every 6 (six) hours as needed for wheezing or shortness of breath       No current facility-administered medications for this visit        Current Outpatient Medications on File Prior to Visit   Medication Sig    cyanocobalamin (VITAMIN B-12) 100 mcg tablet Take 100 tablets by mouth    ergocalciferol (VITAMIN D2) 50,000 units take 1 capsule by mouth every week    meclizine (ANTIVERT) 25 mg tablet take 1 tablet by mouth every 8 hours if needed for dizziness    [DISCONTINUED] acetaminophen (TYLENOL) 650 mg CR tablet Take 1 tablet (650 mg total) by mouth every 8 (eight) hours as needed for mild pain    levalbuterol (XOPENEX) 0 63 mg/3 mL nebulizer solution Take 0 63 mg by nebulization every 6 (six) hours as needed for wheezing or shortness of breath     No current facility-administered medications on file prior to visit  She is allergic to alendronate; biaxin [clarithromycin]; clindamycin; raloxifene; risedronate; and iodine       Review of Systems   Constitutional: Negative for chills and fever  HENT: Negative for congestion, ear pain and sore throat  Eyes: Negative for pain  Respiratory: Negative for cough and shortness of breath  Cardiovascular: Negative for chest pain and leg swelling  Gastrointestinal: Negative for abdominal pain, nausea and vomiting  Endocrine: Negative for polyuria  Genitourinary: Negative for difficulty urinating, frequency and urgency  Musculoskeletal: Positive for arthralgias  Negative for back pain  Skin: Negative for rash  Neurological: Negative for weakness and headaches  Psychiatric/Behavioral: Negative for sleep disturbance  The patient is not nervous/anxious            Objective:      /86 (BP Location: Left arm, Patient Position: Sitting, Cuff Size: Standard)   Pulse 91   Temp 97 9 °F (36 6 °C) (Temporal)   Ht 5' 5" (1 651 m)   Wt 66 7 kg (147 lb)   SpO2 97%   BMI 24 46 kg/m²     Recent Results (from the past 1344 hour(s))   CBC and differential    Collection Time: 03/20/21  7:50 AM   Result Value Ref Range    WBC 5 77 4 31 - 10 16 Thousand/uL    RBC 4 98 3 81 - 5 12 Million/uL    Hemoglobin 14 5 11 5 - 15 4 g/dL    Hematocrit 45 5 34 8 - 46 1 %    MCV 91 82 - 98 fL    MCH 29 1 26 8 - 34 3 pg    MCHC 31 9 31 4 - 37 4 g/dL    RDW 13 5 11 6 - 15 1 %    MPV 9 7 8 9 - 12 7 fL    Platelets 228 900 - 015 Thousands/uL    nRBC 0 /100 WBCs    Neutrophils Relative 46 43 - 75 %    Immat GRANS % 1 0 - 2 %    Lymphocytes Relative 39 14 - 44 %    Monocytes Relative 10 4 - 12 %    Eosinophils Relative 3 0 - 6 %    Basophils Relative 1 0 - 1 %    Neutrophils Absolute 2 75 1 85 - 7 62 Thousands/µL    Immature Grans Absolute 0 03 0 00 - 0 20 Thousand/uL    Lymphocytes Absolute 2  22 0 60 - 4 47 Thousands/µL    Monocytes Absolute 0 56 0 17 - 1 22 Thousand/µL    Eosinophils Absolute 0 15 0 00 - 0 61 Thousand/µL    Basophils Absolute 0 06 0 00 - 0 10 Thousands/µL   Comprehensive metabolic panel    Collection Time: 03/20/21  7:50 AM   Result Value Ref Range    Sodium 141 136 - 145 mmol/L    Potassium 3 7 3 5 - 5 3 mmol/L    Chloride 107 100 - 108 mmol/L    CO2 28 21 - 32 mmol/L    ANION GAP 6 4 - 13 mmol/L    BUN 16 5 - 25 mg/dL    Creatinine 0 89 0 60 - 1 30 mg/dL    Glucose, Fasting 104 (H) 65 - 99 mg/dL    Calcium 9 4 8 3 - 10 1 mg/dL    AST 12 5 - 45 U/L    ALT 23 12 - 78 U/L    Alkaline Phosphatase 94 46 - 116 U/L    Total Protein 7 8 6 4 - 8 2 g/dL    Albumin 4 0 3 5 - 5 0 g/dL    Total Bilirubin 0 62 0 20 - 1 00 mg/dL    eGFR 60 ml/min/1 73sq m   Hemoglobin A1C    Collection Time: 03/20/21  7:50 AM   Result Value Ref Range    Hemoglobin A1C 5 3 Normal 3 8-5 6%; PreDiabetic 5 7-6 4%; Diabetic >=6 5%; Glycemic control for adults with diabetes <7 0% %     mg/dl   Lipid Panel with Direct LDL reflex    Collection Time: 03/20/21  7:50 AM   Result Value Ref Range    Cholesterol 289 (H) 50 - 200 mg/dL    Triglycerides 82 <=150 mg/dL    HDL, Direct 97 >=40 mg/dL    LDL Calculated 176 (H) 0 - 100 mg/dL   TSH, 3rd generation    Collection Time: 03/20/21  7:50 AM   Result Value Ref Range    TSH 3RD GENERATON 3 180 0 358 - 3 740 uIU/mL   Vitamin D 25 hydroxy    Collection Time: 03/20/21  7:50 AM   Result Value Ref Range    Vit D, 25-Hydroxy 83 1 30 0 - 100 0 ng/mL        Physical Exam  Constitutional:       Appearance: Normal appearance  HENT:      Head: Normocephalic  Right Ear: Tympanic membrane, ear canal and external ear normal       Left Ear: Tympanic membrane, ear canal and external ear normal       Nose: Nose normal  No congestion  Mouth/Throat:      Mouth: Mucous membranes are moist       Pharynx: Oropharynx is clear   No oropharyngeal exudate or posterior oropharyngeal erythema  Eyes:      Extraocular Movements: Extraocular movements intact  Conjunctiva/sclera: Conjunctivae normal       Pupils: Pupils are equal, round, and reactive to light  Neck:      Musculoskeletal: Normal range of motion and neck supple  Cardiovascular:      Rate and Rhythm: Normal rate and regular rhythm  Heart sounds: Normal heart sounds  No murmur  Pulmonary:      Effort: Pulmonary effort is normal       Breath sounds: Normal breath sounds  No wheezing or rales  Abdominal:      General: Bowel sounds are normal  There is no distension  Palpations: Abdomen is soft  Tenderness: There is no abdominal tenderness  Musculoskeletal: Normal range of motion  Right lower leg: No edema  Left lower leg: No edema  Lymphadenopathy:      Cervical: No cervical adenopathy  Skin:     General: Skin is warm  Neurological:      General: No focal deficit present  Mental Status: She is alert and oriented to person, place, and time

## 2021-05-10 ENCOUNTER — OFFICE VISIT (OUTPATIENT)
Dept: INTERNAL MEDICINE CLINIC | Facility: CLINIC | Age: 86
End: 2021-05-10
Payer: COMMERCIAL

## 2021-05-10 VITALS
OXYGEN SATURATION: 96 % | TEMPERATURE: 98.2 F | SYSTOLIC BLOOD PRESSURE: 146 MMHG | HEIGHT: 65 IN | WEIGHT: 147 LBS | HEART RATE: 87 BPM | DIASTOLIC BLOOD PRESSURE: 84 MMHG | BODY MASS INDEX: 24.49 KG/M2

## 2021-05-10 DIAGNOSIS — R73.01 IMPAIRED FASTING GLUCOSE: ICD-10-CM

## 2021-05-10 DIAGNOSIS — J45.20 MILD INTERMITTENT ASTHMA WITHOUT COMPLICATION: ICD-10-CM

## 2021-05-10 DIAGNOSIS — E55.9 VITAMIN D DEFICIENCY DISEASE: ICD-10-CM

## 2021-05-10 DIAGNOSIS — L03.116 CELLULITIS OF LEFT LOWER EXTREMITY: ICD-10-CM

## 2021-05-10 DIAGNOSIS — Z00.00 MEDICARE ANNUAL WELLNESS VISIT, SUBSEQUENT: ICD-10-CM

## 2021-05-10 DIAGNOSIS — E78.2 MIXED HYPERLIPIDEMIA: ICD-10-CM

## 2021-05-10 DIAGNOSIS — T78.40XA ALLERGIC REACTION, INITIAL ENCOUNTER: Primary | ICD-10-CM

## 2021-05-10 PROCEDURE — 3288F FALL RISK ASSESSMENT DOCD: CPT | Performed by: INTERNAL MEDICINE

## 2021-05-10 PROCEDURE — 99214 OFFICE O/P EST MOD 30 MIN: CPT | Performed by: INTERNAL MEDICINE

## 2021-05-10 PROCEDURE — 1125F AMNT PAIN NOTED PAIN PRSNT: CPT | Performed by: INTERNAL MEDICINE

## 2021-05-10 PROCEDURE — 1170F FXNL STATUS ASSESSED: CPT | Performed by: INTERNAL MEDICINE

## 2021-05-10 PROCEDURE — G0439 PPPS, SUBSEQ VISIT: HCPCS | Performed by: INTERNAL MEDICINE

## 2021-05-10 PROCEDURE — 3725F SCREEN DEPRESSION PERFORMED: CPT | Performed by: INTERNAL MEDICINE

## 2021-05-10 RX ORDER — CEPHALEXIN 500 MG/1
500 CAPSULE ORAL EVERY 8 HOURS SCHEDULED
Qty: 21 CAPSULE | Refills: 0 | Status: SHIPPED | OUTPATIENT
Start: 2021-05-10 | End: 2021-05-17

## 2021-05-10 RX ORDER — MONTELUKAST SODIUM 10 MG/1
10 TABLET ORAL
Qty: 15 TABLET | Refills: 0 | Status: SHIPPED | OUTPATIENT
Start: 2021-05-10 | End: 2021-05-20

## 2021-05-10 NOTE — PROGRESS NOTES
Assessment and Plan:     Problem List Items Addressed This Visit        Endocrine    Impaired fasting glucose       Respiratory    Mild intermittent asthma without complication       Other    Vitamin D deficiency disease    Mixed hyperlipidemia    Medicare annual wellness visit, subsequent    Allergic reaction - Primary    Cellulitis of left lower extremity           Preventive health issues were discussed with patient, and age appropriate screening tests were ordered as noted in patient's After Visit Summary  Personalized health advice and appropriate referrals for health education or preventive services given if needed, as noted in patient's After Visit Summary       History of Present Illness:     Patient presents for Medicare Annual Wellness visit    Patient Care Team:  Ruchi Garcia MD as PCP - General (Internal Medicine)     Problem List:     Patient Active Problem List   Diagnosis    Vitamin D deficiency disease    Mixed hyperlipidemia    Impaired fasting glucose    Rotator cuff arthropathy, left    Fall (on) (from) other stairs and steps, initial encounter    Mild intermittent asthma without complication    Martinez neuroma, left    Screening for malignant neoplasm of colon    Colon cancer screening declined    Medicare annual wellness visit, subsequent    Allergic reaction    Cellulitis of left lower extremity      Past Medical and Surgical History:     Past Medical History:   Diagnosis Date    Abnormal weight loss     Allergic rhinitis     NOS    Allergic rhinitis due to allergen     Anxiety disorder     Asthma     NOS    Body mass index (BMI) 22 0-22 9, adult 01/25/2017    Body mass index (BMI) 23 0-23 9, adult 08/16/2017    Body mass index (BMI) 24 0-24 9, adult 11/23/2015    COPD (chronic obstructive pulmonary disease) ([facility] [address] )     Crushing injury of multiple sites of trunk     Disorder of rotator cuff, left     Disorder of sacrum     Elevated blood pressure reading without diagnosis of hypertension     Enthesopathy     Hypercholesterolemia     Hyperlipemia     Hyperlipidemia, unspecified     Hypertension     Impaired fasting glucose     Insomnia     Loss of weight     Osteoarthritis     Osteoporosis     Other asthma     Sacrococcygeal disorders, not elsewhere classified     Shingles 11/07/2012    T6-7 dermatome, right    Unspecified osteoarthritis, unspecified site     Vitamin D deficiency      Past Surgical History:   Procedure Laterality Date    COLONOSCOPY  01/23/2009    3 broad-based polyps found in the ascending colon and hepatic flexure; all polyps removed by snare cautery polypectomy  Colonoscopy recommended in 5 years  Dr Bobby Blood        Family History:     No family history on file  Social History:        Social History     Socioeconomic History    Marital status:       Spouse name: None    Number of children: None    Years of education: None    Highest education level: None   Occupational History    None   Social Needs    Financial resource strain: None    Food insecurity     Worry: None     Inability: None    Transportation needs     Medical: None     Non-medical: None   Tobacco Use    Smoking status: Former Smoker    Smokeless tobacco: Never Used    Tobacco comment: last smoked > 10 years - As per [employer]   Substance and Sexual Activity    Alcohol use: Not Currently     Comment: Alcohol: Socially - As per [employer] Drug use: None    Sexual activity: None   Lifestyle    Physical activity     Days per week: None     Minutes per session: None    Stress: None   Relationships    Social connections     Talks on phone: None     Gets together: None     Attends Judaism service: None     Active member of club or organization: None     Attends meetings of clubs or organizations: None     Relationship status: None    Intimate partner violence     Fear of current or ex partner: None Emotionally abused: None     Physically abused: None     Forced sexual activity: None   Other Topics Concern    None   Social History Narrative    None      Medications and Allergies:     Current Outpatient Medications   Medication Sig Dispense Refill    acetaminophen (TYLENOL) 650 mg CR tablet Take 1 tablet (650 mg total) by mouth every 8 (eight) hours as needed for mild pain She prefers capsule, please give her capsules  60 tablet 1    cyanocobalamin (VITAMIN B-12) 100 mcg tablet Take 100 tablets by mouth      ergocalciferol (VITAMIN D2) 50,000 units take 1 capsule by mouth every week 12 capsule 1    meclizine (ANTIVERT) 25 mg tablet take 1 tablet by mouth every 8 hours if needed for dizziness 30 tablet 0    levalbuterol (XOPENEX) 0 63 mg/3 mL nebulizer solution Take 0 63 mg by nebulization every 6 (six) hours as needed for wheezing or shortness of breath       No current facility-administered medications for this visit        Allergies   Allergen Reactions    Alendronate Hives    Biaxin [Clarithromycin]     Clindamycin     Raloxifene Hives    Risedronate Hives    Iodine - Food Allergy Rash      Immunizations:     Immunization History   Administered Date(s) Administered    DTaP 09/28/2019    DTaP,unspecified 09/28/2019    H1N1, All Formulations 12/18/2009    INFLUENZA 10/16/2014    Influenza, high dose seasonal 0 7 mL 10/23/2020    Pneumococcal 10/20/2006    Pneumococcal Conjugate 13-Valent 10/09/2015    Pneumococcal Conjugate PCV 7 10/20/2006    SARS-CoV-2 / COVID-19 mRNA IM (Pfizer-BioNTech) 01/23/2021, 02/12/2021    Tdap 09/28/2018    Zoster 08/04/2008    Zoster Vaccine Recombinant 08/28/2018, 09/28/2018, 01/15/2019, 01/15/2019      Health Maintenance:         Topic Date Due    Colonoscopy Surveillance  01/23/2014         Topic Date Due    Pneumococcal Vaccine: 65+ Years (2 of 2 - PPSV23) 10/09/2016      Medicare Health Risk Assessment:     /84 (BP Location: Left arm, Patient Position: Sitting, Cuff Size: Standard)   Pulse 87   Temp 98 2 °F (36 8 °C) (Temporal)   Ht 5' 5" (1 651 m)   Wt 66 7 kg (147 lb)   SpO2 96%   BMI 24 46 kg/m²      Leonor Deleon is here for her Subsequent Wellness visit  Last Medicare Wellness visit information reviewed, patient interviewed and updates made to the record today  Health Risk Assessment:   Patient rates overall health as very good  Patient feels that their physical health rating is same  Patient is very satisfied with their life  Eyesight was rated as same  Hearing was rated as same  Patient feels that their emotional and mental health rating is slightly better  Patients states they are never, rarely angry  Patient states they are never, rarely unusually tired/fatigued  Pain experienced in the last 7 days has been a lot  Patient's pain rating has been 10/10  Patient states that she has experienced no weight loss or gain in last 6 months  Depression Screening:   PHQ-2 Score: 0      Fall Risk Screening: In the past year, patient has experienced: no history of falling in past year      Urinary Incontinence Screening:   Patient has not leaked urine accidently in the last six months  Home Safety:  Patient does not have trouble with stairs inside or outside of their home  Patient has working smoke alarms and has working carbon monoxide detector  Home safety hazards include: none  Nutrition:   Current diet is Regular  Medications:   Patient is currently taking over-the-counter supplements  OTC medications include: see medication list  Patient is able to manage medications  Activities of Daily Living (ADLs)/Instrumental Activities of Daily Living (IADLs):   Walk and transfer into and out of bed and chair?: Yes  Dress and groom yourself?: Yes    Bathe or shower yourself?: Yes    Feed yourself?  Yes  Do your laundry/housekeeping?: Yes  Manage your money, pay your bills and track your expenses?: Yes  Make your own meals?: Yes    Do your own shopping?: Yes    Previous Hospitalizations:   Any hospitalizations or ED visits within the last 12 months?: Yes      Advance Care Planning:   Living will: No      PREVENTIVE SCREENINGS      Cardiovascular Screening:    General: Screening Not Indicated and History Lipid Disorder      Diabetes Screening:     General: Screening Current      Cervical Cancer Screening:    General: Screening Not Indicated      Lung Cancer Screening:     General: Screening Not Indicated    Screening, Brief Intervention, and Referral to Treatment (SBIRT)    Screening  Typical number of drinks in a day: 0  Typical number of drinks in a week: 0  Interpretation: Low risk drinking behavior  Single Item Drug Screening:  How often have you used an illegal drug (including marijuana) or a prescription medication for non-medical reasons in the past year? never    Single Item Drug Screen Score: 0  Interpretation: Negative screen for possible drug use disorder    Other Counseling Topics:   Regular weightbearing exercise         Bolivar Goldman MD

## 2021-05-10 NOTE — PATIENT INSTRUCTIONS
Medicare Preventive Visit Patient Instructions  Thank you for completing your Welcome to Medicare Visit or Medicare Annual Wellness Visit today  Your next wellness visit will be due in one year (5/11/2022)  The screening/preventive services that you may require over the next 5-10 years are detailed below  Some tests may not apply to you based off risk factors and/or age  Screening tests ordered at today's visit but not completed yet may show as past due  Also, please note that scanned in results may not display below  Preventive Screenings:  Service Recommendations Previous Testing/Comments   Colorectal Cancer Screening  * Colonoscopy    * Fecal Occult Blood Test (FOBT)/Fecal Immunochemical Test (FIT)  * Fecal DNA/Cologuard Test  * Flexible Sigmoidoscopy Age: 54-65 years old   Colonoscopy: every 10 years (may be performed more frequently if at higher risk)  OR  FOBT/FIT: every 1 year  OR  Cologuard: every 3 years  OR  Sigmoidoscopy: every 5 years  Screening may be recommended earlier than age 48 if at higher risk for colorectal cancer  Also, an individualized decision between you and your healthcare provider will decide whether screening between the ages of 74-80 would be appropriate  Colonoscopy: 01/23/2009  FOBT/FIT: Not on file  Cologuard: Not on file  Sigmoidoscopy: Not on file          Breast Cancer Screening Age: 36 years old  Frequency: every 1-2 years  Not required if history of left and right mastectomy Mammogram: 09/26/2016        Cervical Cancer Screening Between the ages of 21-29, pap smear recommended once every 3 years  Between the ages of 33-67, can perform pap smear with HPV co-testing every 5 years     Recommendations may differ for women with a history of total hysterectomy, cervical cancer, or abnormal pap smears in past  Pap Smear: Not on file    Screening Not Indicated   Hepatitis C Screening Once for adults born between [facility]  More frequently in patients at high risk for Hepatitis C Hep C Antibody: Not on file        Diabetes Screening 1-2 times per year if you're at risk for diabetes or have pre-diabetes Fasting glucose: 104 mg/dL   A1C: 5 3 %    Screening Current   Cholesterol Screening Once every 5 years if you don't have a lipid disorder  May order more often based on risk factors  Lipid panel: 03/20/2021    Screening Not Indicated  History Lipid Disorder     Other Preventive Screenings Covered by Medicare:  1  Abdominal Aortic Aneurysm (AAA) Screening: covered once if your at risk  You're considered to be at risk if you have a family history of AAA  2  Lung Cancer Screening: covers low dose CT scan once per year if you meet all of the following conditions: (1) Age 50-69; (2) No signs or symptoms of lung cancer; (3) Current smoker or have quit smoking within the last 15 years; (4) You have a tobacco smoking history of at least 30 pack years (packs per day multiplied by number of years you smoked); (5) You get a written order from a healthcare provider  3  Glaucoma Screening: covered annually if you're considered high risk: (1) You have diabetes OR (2) Family history of glaucoma OR (3)  aged 48 and older OR (3)  American aged 72 and older  3  Osteoporosis Screening: covered every 2 years if you meet one of the following conditions: (1) You're estrogen deficient and at risk for osteoporosis based off medical history and other findings; (2) Have a vertebral abnormality; (3) On glucocorticoid therapy for more than 3 months; (4) Have primary hyperparathyroidism; (5) On osteoporosis medications and need to assess response to drug therapy  · Last bone density test (DXA Scan): 05/01/2015  5  HIV Screening: covered annually if you're between the age of 12-76  Also covered annually if you are younger than 13 and older than 72 with risk factors for HIV infection  For pregnant patients, it is covered up to 3 times per pregnancy      Immunizations:  Immunization Recommendations   Influenza Vaccine Annual influenza vaccination during flu season is recommended for all persons aged >= 6 months who do not have contraindications   Pneumococcal Vaccine (Prevnar and Pneumovax)  * Prevnar = PCV13  * Pneumovax = PPSV23   Adults 25-60 years old: 1-3 doses may be recommended based on certain risk factors  Adults 72 years old: Prevnar (PCV13) vaccine recommended followed by Pneumovax (PPSV23) vaccine  If already received PPSV23 since turning 65, then PCV13 recommended at least one year after PPSV23 dose  Hepatitis B Vaccine 3 dose series if at intermediate or high risk (ex: diabetes, end stage renal disease, liver disease)   Tetanus (Td) Vaccine - COST NOT COVERED BY MEDICARE PART B Following completion of primary series, a booster dose should be given every 10 years to maintain immunity against tetanus  Td may also be given as tetanus wound prophylaxis  Tdap Vaccine - COST NOT COVERED BY MEDICARE PART B Recommended at least once for all adults  For pregnant patients, recommended with each pregnancy  Shingles Vaccine (Shingrix) - COST NOT COVERED BY MEDICARE PART B  2 shot series recommended in those aged 48 and above     Health Maintenance Due:      Topic Date Due    Colonoscopy Surveillance  01/23/2014     Immunizations Due:      Topic Date Due    Pneumococcal Vaccine: 65+ Years (2 of 2 - PPSV23) 10/09/2016     Advance Directives   What are advance directives? Advance directives are legal documents that state your wishes and plans for medical care  These plans are made ahead of time in case you lose your ability to make decisions for yourself  Advance directives can apply to any medical decision, such as the treatments you want, and if you want to donate organs  What are the types of advance directives? There are many types of advance directives, and each state has rules about how to use them  You may choose a combination of any of the following:  · Living will:   This is a written record of the treatment you want  You can also choose which treatments you do not want, which to limit, and which to stop at a certain time  This includes surgery, medicine, IV fluid, and tube feedings  · Durable power of  for healthcare [facility]): This is a written record that states who you want to make healthcare choices for you when you are unable to make them for yourself  This person, called a proxy, is usually a family member or a friend  You may choose more than 1 proxy  · Do not resuscitate (DNR) order:  A DNR order is used in case your heart stops beating or you stop breathing  It is a request not to have certain forms of treatment, such as CPR  A DNR order may be included in other types of advance directives  · Medical directive: This covers the care that you want if you are in a coma, near death, or unable to make decisions for yourself  You can list the treatments you want for each condition  Treatment may include pain medicine, surgery, blood transfusions, dialysis, IV or tube feedings, and a ventilator (breathing machine)  · Values history: This document has questions about your views, beliefs, and how you feel and think about life  This information can help others choose the care that you would choose  Why are advance directives important? An advance directive helps you control your care  Although spoken wishes may be used, it is better to have your wishes written down  Spoken wishes can be misunderstood, or not followed  Treatments may be given even if you do not want them  An advance directive may make it easier for your family to make difficult choices about your care  © Copyright [employer] 2018 Information is for End User's use only and may not be sold, redistributed or otherwise used for commercial purposes   All illustrations and images included in CareNotes® are the copyrighted property of [employer]  or [facility]

## 2021-05-10 NOTE — PROGRESS NOTES
Assessment/Plan:      she could her she could have chigger bite with inflammatory reaction, not sure about infection, will start Keflex,        1  Allergic reaction, initial encounter  -     montelukast (SINGULAIR) 10 mg tablet; Take 1 tablet (10 mg total) by mouth daily at bedtime    2  Medicare annual wellness visit, subsequent    3  Cellulitis of left lower extremity  -     cephalexin (KEFLEX) 500 mg capsule; Take 1 capsule (500 mg total) by mouth every 8 (eight) hours for 7 days    4  Mixed hyperlipidemia    5  Vitamin D deficiency disease    6  Impaired fasting glucose    7  Mild intermittent asthma without complication           Subjective:      Patient ID: Sylvester Gracia is a 82 y o  female  Left leg rash, mild pain, itchy, headache chills also, needs medical wellness exam    Rash  Pertinent negatives include no congestion, cough, eye pain, fever, shortness of breath, sore throat or vomiting  The following portions of the patient's history were reviewed and updated as appropriate: She  has a past medical history of Abnormal weight loss, Allergic rhinitis, Allergic rhinitis due to allergen, Anxiety disorder, Asthma, Body mass index (BMI) 22 0-22 9, adult (01/25/2017), Body mass index (BMI) 23 0-23 9, adult (08/16/2017), Body mass index (BMI) 24 0-24 9, adult (11/23/2015), COPD (chronic obstructive pulmonary disease) ([facility]), Crushing injury of multiple sites of trunk, Disorder of rotator cuff, left, Disorder of sacrum, Elevated blood pressure reading without diagnosis of hypertension, Enthesopathy, Hypercholesterolemia, Hyperlipemia, Hyperlipidemia, unspecified, Hypertension, Impaired fasting glucose, Insomnia, Loss of weight, Osteoarthritis, Osteoporosis, Other asthma, Sacrococcygeal disorders, not elsewhere classified, Shingles (11/07/2012), Unspecified osteoarthritis, unspecified site, and Vitamin D deficiency    She   Patient Active Problem List    Diagnosis Date Noted    Medicare annual wellness visit, subsequent 05/10/2021    Allergic reaction 05/10/2021    Cellulitis of left lower extremity 05/10/2021    Martinez neuroma, 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    Screening for malignant neoplasm of colon 03/24/2021    Colon cancer screening declined 03/24/2021    Mild intermittent asthma without complication 08/71/9890    Rotator cuff arthropathy, left 09/29/2020    Fall (on) (from) other stairs and steps, initial encounter 09/29/2020    Vitamin D deficiency disease 09/14/2020    Mixed hyperlipidemia 09/14/2020    Impaired fasting glucose 09/14/2020     She  has a past surgical history that includes Sinus surgery and Colonoscopy (01/23/2009)  Her family history is not on file  She  reports that she has quit smoking  She has never used smokeless tobacco  She reports previous alcohol use  No history on file for drug  Current Outpatient Medications   Medication Sig Dispense Refill    acetaminophen (TYLENOL) 650 mg CR tablet Take 1 tablet (650 mg total) by mouth every 8 (eight) hours as needed for mild pain She prefers capsule, please give her capsules  60 tablet 1    cyanocobalamin (VITAMIN B-12) 100 mcg tablet Take 100 tablets by mouth      ergocalciferol (VITAMIN D2) 50,000 units take 1 capsule by mouth every week 12 capsule 1    meclizine (ANTIVERT) 25 mg tablet take 1 tablet by mouth every 8 hours if needed for dizziness 30 tablet 0    cephalexin (KEFLEX) 500 mg capsule Take 1 capsule (500 mg total) by mouth every 8 (eight) hours for 7 days 21 capsule 0    levalbuterol (XOPENEX) 0 63 mg/3 mL nebulizer solution Take 0 63 mg by nebulization every 6 (six) hours as needed for wheezing or shortness of breath      montelukast (SINGULAIR) 10 mg tablet Take 1 tablet (10 mg total) by mouth daily at bedtime 15 tablet 0     No current facility-administered medications for this visit        Current Outpatient Medications on File Prior to Visit   Medication Sig    acetaminophen (TYLENOL) 650 mg CR tablet Take 1 tablet (650 mg total) by mouth every 8 (eight) hours as needed for mild pain She prefers capsule, please give her capsules   cyanocobalamin (VITAMIN B-12) 100 mcg tablet Take 100 tablets by mouth    ergocalciferol (VITAMIN D2) 50,000 units take 1 capsule by mouth every week    meclizine (ANTIVERT) 25 mg tablet take 1 tablet by mouth every 8 hours if needed for dizziness    levalbuterol (XOPENEX) 0 63 mg/3 mL nebulizer solution Take 0 63 mg by nebulization every 6 (six) hours as needed for wheezing or shortness of breath     No current facility-administered medications on file prior to visit  She is allergic to alendronate; biaxin [clarithromycin]; clindamycin; raloxifene; risedronate; and iodine - food allergy       Review of Systems   Constitutional: Negative for chills and fever  HENT: Negative for congestion, ear pain and sore throat  Eyes: Negative for pain  Respiratory: Negative for cough and shortness of breath  Cardiovascular: Negative for chest pain and leg swelling  Gastrointestinal: Negative for abdominal pain, nausea and vomiting  Endocrine: Negative for polyuria  Genitourinary: Negative for difficulty urinating, frequency and urgency  Musculoskeletal: Negative for arthralgias and back pain  Skin: Positive for rash  Neurological: Negative for weakness and headaches  Psychiatric/Behavioral: Negative for sleep disturbance  The patient is not nervous/anxious            Objective:      /84 (BP Location: Left arm, Patient Position: Sitting, Cuff Size: Standard)   Pulse 87   Temp 98 2 °F (36 8 °C) (Temporal)   Ht 5' 5" (1 651 m)   Wt 66 7 kg (147 lb)   SpO2 96%   BMI 24 46 kg/m²     Recent Results (from the past 1344 hour(s))   CBC and differential    Collection Time: 03/20/21  7:50 AM   Result Value Ref Range    WBC 5 77 4 31 - 10 16 Thousand/uL    RBC 4 98 3 81 - 5 12 Million/uL    Hemoglobin 14 5 11 5 - 15 4 g/dL    Hematocrit 45 5 34 8 - 46 1 %    MCV 91 82 - 98 fL    MCH 29 1 26 8 - 34 3 pg    MCHC 31 9 31 4 - 37 4 g/dL    RDW 13 5 11 6 - 15 1 %    MPV 9 7 8 9 - 12 7 fL    Platelets 116 907 - 310 Thousands/uL    nRBC 0 /100 WBCs    Neutrophils Relative 46 43 - 75 %    Immat GRANS % 1 0 - 2 %    Lymphocytes Relative 39 14 - 44 %    Monocytes Relative 10 4 - 12 %    Eosinophils Relative 3 0 - 6 %    Basophils Relative 1 0 - 1 %    Neutrophils Absolute 2 75 1 85 - 7 62 Thousands/µL    Immature Grans Absolute 0 03 0 00 - 0 20 Thousand/uL    Lymphocytes Absolute 2 22 0 60 - 4 47 Thousands/µL    Monocytes Absolute 0 56 0 17 - 1 22 Thousand/µL    Eosinophils Absolute 0 15 0 00 - 0 61 Thousand/µL    Basophils Absolute 0 06 0 00 - 0 10 Thousands/µL   Comprehensive metabolic panel    Collection Time: 03/20/21  7:50 AM   Result Value Ref Range    Sodium 141 136 - 145 mmol/L    Potassium 3 7 3 5 - 5 3 mmol/L    Chloride 107 100 - 108 mmol/L    CO2 28 21 - 32 mmol/L    ANION GAP 6 4 - 13 mmol/L    BUN 16 5 - 25 mg/dL    Creatinine 0 89 0 60 - 1 30 mg/dL    Glucose, Fasting 104 (H) 65 - 99 mg/dL    Calcium 9 4 8 3 - 10 1 mg/dL    AST 12 5 - 45 U/L    ALT 23 12 - 78 U/L    Alkaline Phosphatase 94 46 - 116 U/L    Total Protein 7 8 6 4 - 8 2 g/dL    Albumin 4 0 3 5 - 5 0 g/dL    Total Bilirubin 0 62 0 20 - 1 00 mg/dL    eGFR 60 ml/min/1 73sq m   Hemoglobin A1C    Collection Time: 03/20/21  7:50 AM   Result Value Ref Range    Hemoglobin A1C 5 3 Normal 3 8-5 6%; PreDiabetic 5 7-6 4%;  Diabetic >=6 5%; Glycemic control for adults with diabetes <7 0% %     mg/dl   Lipid Panel with Direct LDL reflex    Collection Time: 03/20/21  7:50 AM   Result Value Ref Range    Cholesterol 289 (H) 50 - 200 mg/dL    Triglycerides 82 <=150 mg/dL    HDL, Direct 97 >=40 mg/dL    LDL Calculated 176 (H) 0 - 100 mg/dL   TSH, 3rd generation    Collection Time: 03/20/21  7:50 AM   Result Value Ref Range    TSH 3RD GENERATON 3 180 0 358 - 3 740 uIU/mL   Vitamin D 25 hydroxy    Collection Time: 03/20/21  7:50 AM   Result Value Ref Range    Vit D, 25-Hydroxy 83 1 30 0 - 100 0 ng/mL        Physical Exam  Constitutional:       Appearance: Normal appearance  HENT:      Head: Normocephalic  Right Ear: Tympanic membrane, ear canal and external ear normal       Left Ear: Tympanic membrane, ear canal and external ear normal       Nose: Nose normal  No congestion  Mouth/Throat:      Mouth: Mucous membranes are moist       Pharynx: Oropharynx is clear  No oropharyngeal exudate or posterior oropharyngeal erythema  Eyes:      Extraocular Movements: Extraocular movements intact  Conjunctiva/sclera: Conjunctivae normal       Pupils: Pupils are equal, round, and reactive to light  Neck:      Musculoskeletal: Normal range of motion and neck supple  Cardiovascular:      Rate and Rhythm: Normal rate and regular rhythm  Heart sounds: Normal heart sounds  No murmur  Pulmonary:      Effort: Pulmonary effort is normal       Breath sounds: Normal breath sounds  No wheezing or rales  Abdominal:      General: Bowel sounds are normal  There is no distension  Palpations: Abdomen is soft  Tenderness: There is no abdominal tenderness  Musculoskeletal: Normal range of motion  Right lower leg: No edema  Left lower leg: No edema  Lymphadenopathy:      Cervical: No cervical adenopathy  Skin:     General: Skin is warm  Comments: Left leg, macular red rash with central papules, mild tenderness, multiple from thigh down, surrounding skin redness present   Neurological:      General: No focal deficit present  Mental Status: She is alert and oriented to person, place, and time

## 2021-05-19 ENCOUNTER — OFFICE VISIT (OUTPATIENT)
Dept: INTERNAL MEDICINE CLINIC | Facility: CLINIC | Age: 86
End: 2021-05-19
Payer: COMMERCIAL

## 2021-05-19 VITALS
OXYGEN SATURATION: 98 % | HEART RATE: 82 BPM | SYSTOLIC BLOOD PRESSURE: 146 MMHG | DIASTOLIC BLOOD PRESSURE: 82 MMHG | TEMPERATURE: 98.2 F

## 2021-05-19 DIAGNOSIS — D36.13 NEUROMA OF FOOT: ICD-10-CM

## 2021-05-19 DIAGNOSIS — M79.672 ACUTE PAIN OF LEFT FOOT: Primary | ICD-10-CM

## 2021-05-19 PROCEDURE — 1160F RVW MEDS BY RX/DR IN RCRD: CPT | Performed by: INTERNAL MEDICINE

## 2021-05-19 PROCEDURE — 1036F TOBACCO NON-USER: CPT | Performed by: INTERNAL MEDICINE

## 2021-05-19 PROCEDURE — 99213 OFFICE O/P EST LOW 20 MIN: CPT | Performed by: INTERNAL MEDICINE

## 2021-05-19 NOTE — PROGRESS NOTES
Assessment/Plan:             1  Acute pain of left foot  -     Ambulatory referral to Podiatry; Future    2  Neuroma of foot  -     Ambulatory referral to Podiatry; Future           Subjective:      Patient ID: Trent Aquino is a 82 y o  female  Left foot pain, no trauma      The following portions of the patient's history were reviewed and updated as appropriate: She  has a past medical history of Abnormal weight loss, Allergic rhinitis, Allergic rhinitis due to allergen, Anxiety disorder, Asthma, Body mass index (BMI) 22 0-22 9, adult (01/25/2017), Body mass index (BMI) 23 0-23 9, adult (08/16/2017), Body mass index (BMI) 24 0-24 9, adult (11/23/2015), COPD (chronic obstructive pulmonary disease) ([facility]), Crushing injury of multiple sites of trunk, Disorder of rotator cuff, left, Disorder of sacrum, Elevated blood pressure reading without diagnosis of hypertension, Enthesopathy, Hypercholesterolemia, Hyperlipemia, Hyperlipidemia, unspecified, Hypertension, Impaired fasting glucose, Insomnia, Loss of weight, Osteoarthritis, Osteoporosis, Other asthma, Sacrococcygeal disorders, not elsewhere classified, Shingles (11/07/2012), Unspecified osteoarthritis, unspecified site, and Vitamin D deficiency    She   Patient Active Problem List    Diagnosis Date Noted    Acute pain of left foot 05/19/2021    Neuroma of foot 05/19/2021    Medicare annual wellness visit, subsequent 05/10/2021    Allergic reaction 05/10/2021    Cellulitis of left lower extremity 05/10/2021    Martinez neuroma, 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    Screening for malignant neoplasm of colon 03/24/2021    Colon cancer screening declined 03/24/2021    Mild intermittent asthma without complication 54/53/8467    Rotator cuff arthropathy, left 09/29/2020    Fall (on) (from) other stairs and steps, initial encounter 09/29/2020    Vitamin D deficiency disease 09/14/2020    Mixed hyperlipidemia 09/14/2020    Impaired fasting glucose 09/14/2020     She  has a past surgical history that includes Sinus surgery and Colonoscopy (01/23/2009)  Her family history is not on file  She  reports that she has quit smoking  She has never used smokeless tobacco  She reports previous alcohol use  No history on file for drug  Current Outpatient Medications   Medication Sig Dispense Refill    acetaminophen (TYLENOL) 650 mg CR tablet Take 1 tablet (650 mg total) by mouth every 8 (eight) hours as needed for mild pain She prefers capsule, please give her capsules  60 tablet 1    cyanocobalamin (VITAMIN B-12) 100 mcg tablet Take 100 tablets by mouth      ergocalciferol (VITAMIN D2) 50,000 units take 1 capsule by mouth every week 12 capsule 1    levalbuterol (XOPENEX) 0 63 mg/3 mL nebulizer solution Take 0 63 mg by nebulization every 6 (six) hours as needed for wheezing or shortness of breath      meclizine (ANTIVERT) 25 mg tablet take 1 tablet by mouth every 8 hours if needed for dizziness 30 tablet 0    montelukast (SINGULAIR) 10 mg tablet Take 1 tablet (10 mg total) by mouth daily at bedtime 15 tablet 0     No current facility-administered medications for this visit  Current Outpatient Medications on File Prior to Visit   Medication Sig    acetaminophen (TYLENOL) 650 mg CR tablet Take 1 tablet (650 mg total) by mouth every 8 (eight) hours as needed for mild pain She prefers capsule, please give her capsules   cyanocobalamin (VITAMIN B-12) 100 mcg tablet Take 100 tablets by mouth    ergocalciferol (VITAMIN D2) 50,000 units take 1 capsule by mouth every week    levalbuterol (XOPENEX) 0 63 mg/3 mL nebulizer solution Take 0 63 mg by nebulization every 6 (six) hours as needed for wheezing or shortness of breath    meclizine (ANTIVERT) 25 mg tablet take 1 tablet by mouth every 8 hours if needed for dizziness    montelukast (SINGULAIR) 10 mg tablet Take 1 tablet (10 mg total) by mouth daily at bedtime     No current facility-administered medications on file prior to visit  She is allergic to alendronate; biaxin [clarithromycin]; clindamycin; raloxifene; risedronate; and iodine - food allergy       Review of Systems   Constitutional: Negative for chills and fever  HENT: Negative for congestion, ear pain and sore throat  Eyes: Negative for pain  Respiratory: Negative for cough and shortness of breath  Cardiovascular: Negative for chest pain and leg swelling  Gastrointestinal: Negative for abdominal pain, nausea and vomiting  Endocrine: Negative for polyuria  Genitourinary: Negative for difficulty urinating, frequency and urgency  Musculoskeletal: Positive for arthralgias  Negative for back pain  Skin: Negative for rash  Neurological: Negative for weakness and headaches  Psychiatric/Behavioral: Negative for sleep disturbance  The patient is not nervous/anxious  Objective:      /82   Pulse 82   Temp 98 2 °F (36 8 °C)   SpO2 98%     No results found for this or any previous visit (from the past 1344 hour(s))  Physical Exam  Constitutional:       Appearance: Normal appearance  HENT:      Head: Normocephalic  Right Ear: Tympanic membrane, ear canal and external ear normal       Left Ear: Tympanic membrane, ear canal and external ear normal       Nose: Nose normal  No congestion  Mouth/Throat:      Mouth: Mucous membranes are moist       Pharynx: Oropharynx is clear  No oropharyngeal exudate or posterior oropharyngeal erythema  Eyes:      Extraocular Movements: Extraocular movements intact  Conjunctiva/sclera: Conjunctivae normal       Pupils: Pupils are equal, round, and reactive to light  Neck:      Musculoskeletal: Normal range of motion and neck supple  Cardiovascular:      Rate and Rhythm: Normal rate and regular rhythm  Heart sounds: Normal heart sounds  No murmur  Pulmonary:      Effort: Pulmonary effort is normal       Breath sounds: Normal breath sounds  No wheezing or rales     Musculoskeletal: Normal range of motion  Right lower leg: No edema  Left lower leg: No edema  Comments: Left foot exam-plantar surface towards the 4th toe base less than 0 5 cm nodule present form, tender, skin looks normal   Lymphadenopathy:      Cervical: No cervical adenopathy  Skin:     General: Skin is warm  Neurological:      General: No focal deficit present  Mental Status: She is alert and oriented to person, place, and time

## 2021-05-20 DIAGNOSIS — T78.40XA ALLERGIC REACTION, INITIAL ENCOUNTER: ICD-10-CM

## 2021-05-20 RX ORDER — MONTELUKAST SODIUM 10 MG/1
TABLET ORAL
Qty: 15 TABLET | Refills: 0 | Status: SHIPPED | OUTPATIENT
Start: 2021-05-20 | End: 2021-08-25

## 2021-08-18 ENCOUNTER — RA CDI HCC (OUTPATIENT)
Dept: OTHER | Facility: HOSPITAL | Age: 86
End: 2021-08-18

## 2021-08-18 NOTE — PROGRESS NOTES
Re: Carlos Chapman    Based on clinical documentation indicated in your record, it appears that the patient may have the following conditions:    J44 9 COPD     If this is correct, please document and assess at your next visit August 25th    [facility] [employer]  coding opportunities             Chart Reviewed * (Number of) Inbasket suggestions sent to Provider: 1                  Patients insurance company: [email or web address] (Medicare Advantage and Commercial)                       [facility] [employer]  coding opportunities             Chart Reviewed * (Number of) Inbasket suggestions sent to Provider: 1               Number of suggestions NOT actually used: 1     Patients insurance company: [email or web address] (Medicare Advantage and Commercial)     Visit status: Patient arrived for their scheduled appointment     Provider never responded to [place] [id number]  coding request

## 2021-08-25 ENCOUNTER — OFFICE VISIT (OUTPATIENT)
Dept: INTERNAL MEDICINE CLINIC | Facility: CLINIC | Age: 86
End: 2021-08-25
Payer: COMMERCIAL

## 2021-08-25 VITALS
TEMPERATURE: 97.5 F | WEIGHT: 144 LBS | BODY MASS INDEX: 23.99 KG/M2 | HEIGHT: 65 IN | OXYGEN SATURATION: 98 % | HEART RATE: 95 BPM | SYSTOLIC BLOOD PRESSURE: 134 MMHG | DIASTOLIC BLOOD PRESSURE: 72 MMHG

## 2021-08-25 DIAGNOSIS — Z12.11 SCREENING FOR COLON CANCER: ICD-10-CM

## 2021-08-25 DIAGNOSIS — F34.1 DYSTHYMIC DISORDER: Primary | ICD-10-CM

## 2021-08-25 DIAGNOSIS — J01.00 ACUTE MAXILLARY SINUSITIS, RECURRENCE NOT SPECIFIED: ICD-10-CM

## 2021-08-25 DIAGNOSIS — E55.9 VITAMIN D DEFICIENCY DISEASE: ICD-10-CM

## 2021-08-25 PROCEDURE — 1036F TOBACCO NON-USER: CPT | Performed by: INTERNAL MEDICINE

## 2021-08-25 PROCEDURE — 99214 OFFICE O/P EST MOD 30 MIN: CPT | Performed by: INTERNAL MEDICINE

## 2021-08-25 PROCEDURE — 1160F RVW MEDS BY RX/DR IN RCRD: CPT | Performed by: INTERNAL MEDICINE

## 2021-08-25 RX ORDER — ESCITALOPRAM OXALATE 5 MG/1
5 TABLET ORAL DAILY
Qty: 30 TABLET | Refills: 1 | Status: SHIPPED | OUTPATIENT
Start: 2021-08-25 | End: 2021-09-29

## 2021-08-25 RX ORDER — AZITHROMYCIN 250 MG/1
TABLET, FILM COATED ORAL
Qty: 6 TABLET | Refills: 0 | Status: SHIPPED | OUTPATIENT
Start: 2021-08-25 | End: 2021-08-30

## 2021-08-25 NOTE — PROGRESS NOTES
Assessment/Plan:             1  Dysthymic disorder  -     escitalopram (LEXAPRO) 5 mg tablet; Take 1 tablet (5 mg total) by mouth daily    2  Screening for colon cancer  -     Bernardo    3  Acute maxillary sinusitis, recurrence not specified  -     azithromycin (Zithromax) 250 mg tablet; Take 2 tablets (500 mg total) by mouth daily for 1 day, THEN 1 tablet (250 mg total) daily for 4 days  4  Vitamin D deficiency disease           Subjective:      Patient ID: Melissa Beasley is a 82 y o  female  Sore throat, feels sinus acting up, anxious,      The following portions of the patient's history were reviewed and updated as appropriate: She  has a past medical history of Abnormal weight loss, Allergic rhinitis, Allergic rhinitis due to allergen, Anxiety disorder, Asthma, Body mass index (BMI) 22 0-22 9, adult (01/25/2017), Body mass index (BMI) 23 0-23 9, adult (08/16/2017), Body mass index (BMI) 24 0-24 9, adult (11/23/2015), COPD (chronic obstructive pulmonary disease) ([facility]), Crushing injury of multiple sites of trunk, Disorder of rotator cuff, left, Disorder of sacrum, Elevated blood pressure reading without diagnosis of hypertension, Enthesopathy, Hypercholesterolemia, Hyperlipemia, Hyperlipidemia, unspecified, Hypertension, Impaired fasting glucose, Insomnia, Loss of weight, Osteoarthritis, Osteoporosis, Other asthma, Sacrococcygeal disorders, not elsewhere classified, Shingles (11/07/2012), Unspecified osteoarthritis, unspecified site, and Vitamin D deficiency    She   Patient Active Problem List    Diagnosis Date Noted    Acute maxillary sinusitis 08/25/2021    Dysthymic disorder 08/25/2021    Acute pain of left foot 05/19/2021    Neuroma of foot 05/19/2021    Screening for colon cancer 05/10/2021    Allergic reaction 05/10/2021    Cellulitis of left lower extremity 05/10/2021    Martinez neuroma, 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    Screening for malignant neoplasm of colon 03/24/2021    Colon cancer screening declined 03/24/2021    Mild intermittent asthma without complication 22/34/4381    Rotator cuff arthropathy, left 09/29/2020    Fall (on) (from) other stairs and steps, initial encounter 09/29/2020    Vitamin D deficiency disease 09/14/2020    Mixed hyperlipidemia 09/14/2020    Impaired fasting glucose 09/14/2020     She  has a past surgical history that includes Sinus surgery and Colonoscopy (01/23/2009)  Her family history is not on file  She  reports that she has quit smoking  She has never used smokeless tobacco  She reports previous alcohol use  No history on file for drug use  Current Outpatient Medications   Medication Sig Dispense Refill    acetaminophen (TYLENOL) 650 mg CR tablet Take 1 tablet (650 mg total) by mouth every 8 (eight) hours as needed for mild pain She prefers capsule, please give her capsules  60 tablet 1    ergocalciferol (VITAMIN D2) 50,000 units take 1 capsule by mouth every week 12 capsule 1    meclizine (ANTIVERT) 25 mg tablet take 1 tablet by mouth every 8 hours if needed for dizziness 30 tablet 0    azithromycin (Zithromax) 250 mg tablet Take 2 tablets (500 mg total) by mouth daily for 1 day, THEN 1 tablet (250 mg total) daily for 4 days  6 tablet 0    escitalopram (LEXAPRO) 5 mg tablet Take 1 tablet (5 mg total) by mouth daily 30 tablet 1     No current facility-administered medications for this visit  Current Outpatient Medications on File Prior to Visit   Medication Sig    acetaminophen (TYLENOL) 650 mg CR tablet Take 1 tablet (650 mg total) by mouth every 8 (eight) hours as needed for mild pain She prefers capsule, please give her capsules      ergocalciferol (VITAMIN D2) 50,000 units take 1 capsule by mouth every week    meclizine (ANTIVERT) 25 mg tablet take 1 tablet by mouth every 8 hours if needed for dizziness    [DISCONTINUED] cyanocobalamin (VITAMIN B-12) 100 mcg tablet Take 100 tablets by mouth    [DISCONTINUED] levalbuterol (XOPENEX) 0 63 mg/3 mL nebulizer solution Take 0 63 mg by nebulization every 6 (six) hours as needed for wheezing or shortness of breath    [DISCONTINUED] montelukast (SINGULAIR) 10 mg tablet take 1 tablet by mouth daily at bedtime     No current facility-administered medications on file prior to visit  She is allergic to alendronate, biaxin [clarithromycin], clindamycin, raloxifene, risedronate, and iodine - food allergy       Review of Systems   Constitutional: Positive for chills  Negative for fever  HENT: Positive for sore throat  Negative for congestion and ear pain  Eyes: Negative for pain  Respiratory: Positive for cough  Negative for shortness of breath  Cardiovascular: Negative for chest pain and leg swelling  Gastrointestinal: Negative for abdominal pain, nausea and vomiting  Endocrine: Negative for polyuria  Genitourinary: Negative for difficulty urinating, frequency and urgency  Musculoskeletal: Negative for arthralgias and back pain  Skin: Negative for rash  Neurological: Negative for weakness and headaches  Psychiatric/Behavioral: Negative for sleep disturbance  The patient is not nervous/anxious  Objective:      /72 (BP Location: Right arm, Patient Position: Sitting, Cuff Size: Standard)   Pulse 95   Temp 97 5 °F (36 4 °C) (Temporal)   Ht 5' 5" (1 651 m)   Wt 65 3 kg (144 lb)   SpO2 98%   BMI 23 96 kg/m²     No results found for this or any previous visit (from the past 1344 hour(s))  Physical Exam  Constitutional:       Appearance: Normal appearance  HENT:      Head: Normocephalic  Right Ear: Tympanic membrane, ear canal and external ear normal       Left Ear: Tympanic membrane, ear canal and external ear normal       Nose: Nose normal  No congestion  Mouth/Throat:      Mouth: Mucous membranes are moist       Pharynx: Oropharynx is clear  No oropharyngeal exudate or posterior oropharyngeal erythema  Eyes:      Extraocular Movements: Extraocular movements intact  Conjunctiva/sclera: Conjunctivae normal       Pupils: Pupils are equal, round, and reactive to light  Cardiovascular:      Rate and Rhythm: Normal rate and regular rhythm  Heart sounds: Normal heart sounds  No murmur heard  Pulmonary:      Effort: Pulmonary effort is normal       Breath sounds: Normal breath sounds  No wheezing or rales  Abdominal:      General: Bowel sounds are normal  There is no distension  Palpations: Abdomen is soft  Tenderness: There is no abdominal tenderness  Musculoskeletal:         General: Normal range of motion  Cervical back: Normal range of motion and neck supple  Right lower leg: No edema  Left lower leg: No edema  Lymphadenopathy:      Cervical: No cervical adenopathy  Skin:     General: Skin is warm  Neurological:      General: No focal deficit present  Mental Status: She is alert and oriented to person, place, and time

## 2021-08-28 DIAGNOSIS — E55.9 VITAMIN D DEFICIENCY DISEASE: ICD-10-CM

## 2021-08-30 RX ORDER — ERGOCALCIFEROL 1.25 MG/1
CAPSULE ORAL
Qty: 12 CAPSULE | Refills: 1 | Status: SHIPPED | OUTPATIENT
Start: 2021-08-30 | End: 2022-02-16

## 2021-09-07 ENCOUNTER — TELEPHONE (OUTPATIENT)
Dept: INTERNAL MEDICINE CLINIC | Facility: CLINIC | Age: 86
End: 2021-09-07

## 2021-09-07 NOTE — TELEPHONE ENCOUNTER
She wants to know if she can have a refill on the antibiotic because her sinus problem is not fully gone

## 2021-09-08 DIAGNOSIS — J01.00 ACUTE MAXILLARY SINUSITIS, RECURRENCE NOT SPECIFIED: Primary | ICD-10-CM

## 2021-09-08 RX ORDER — AZITHROMYCIN 250 MG/1
TABLET, FILM COATED ORAL
Qty: 6 TABLET | Refills: 0 | Status: SHIPPED | OUTPATIENT
Start: 2021-09-08 | End: 2021-09-13

## 2021-09-22 ENCOUNTER — RA CDI HCC (OUTPATIENT)
Dept: OTHER | Facility: HOSPITAL | Age: 86
End: 2021-09-22

## 2021-09-29 ENCOUNTER — OFFICE VISIT (OUTPATIENT)
Dept: INTERNAL MEDICINE CLINIC | Facility: CLINIC | Age: 86
End: 2021-09-29
Payer: COMMERCIAL

## 2021-09-29 VITALS
HEART RATE: 97 BPM | TEMPERATURE: 97.3 F | DIASTOLIC BLOOD PRESSURE: 82 MMHG | BODY MASS INDEX: 24.49 KG/M2 | SYSTOLIC BLOOD PRESSURE: 128 MMHG | OXYGEN SATURATION: 97 % | HEIGHT: 65 IN | WEIGHT: 147 LBS

## 2021-09-29 DIAGNOSIS — E55.9 VITAMIN D DEFICIENCY DISEASE: Primary | ICD-10-CM

## 2021-09-29 DIAGNOSIS — M12.812 ROTATOR CUFF ARTHROPATHY, LEFT: ICD-10-CM

## 2021-09-29 DIAGNOSIS — M81.0 POST-MENOPAUSAL OSTEOPOROSIS: ICD-10-CM

## 2021-09-29 DIAGNOSIS — J45.20 MILD INTERMITTENT ASTHMA WITHOUT COMPLICATION: ICD-10-CM

## 2021-09-29 DIAGNOSIS — Z23 FLU VACCINE NEED: ICD-10-CM

## 2021-09-29 PROCEDURE — G0008 ADMIN INFLUENZA VIRUS VAC: HCPCS | Performed by: INTERNAL MEDICINE

## 2021-09-29 PROCEDURE — 90662 IIV NO PRSV INCREASED AG IM: CPT | Performed by: INTERNAL MEDICINE

## 2021-09-29 PROCEDURE — 99214 OFFICE O/P EST MOD 30 MIN: CPT | Performed by: INTERNAL MEDICINE

## 2021-09-29 RX ORDER — VITAMIN B COMPLEX
1 CAPSULE ORAL DAILY
COMMUNITY

## 2021-09-29 NOTE — PROGRESS NOTES
Assessment/Plan:             1  Vitamin D deficiency disease    2  Mild intermittent asthma without complication    3  Rotator cuff arthropathy, left    4  Post-menopausal osteoporosis    5  Flu vaccine need  -     FLUZONE HIGH-DOSE: influenza vaccine, high-dose, preservative-free 0 7 mL           Subjective:      Patient ID: Monica Keyes is a 82 y o  female  Follow-up on multiple medical problems to ensure they are stable on current medications      The following portions of the patient's history were reviewed and updated as appropriate: She  has a past medical history of Abnormal weight loss, Allergic rhinitis, Allergic rhinitis due to allergen, Anxiety disorder, Asthma, Body mass index (BMI) 22 0-22 9, adult (01/25/2017), Body mass index (BMI) 23 0-23 9, adult (08/16/2017), Body mass index (BMI) 24 0-24 9, adult (11/23/2015), COPD [facility]chronic obstructive pulmonary disease) ([facility]), Crushing injury of multiple sites of trunk, Disorder of rotator cuff, left, Disorder of sacrum, Elevated blood pressure reading without diagnosis of hypertension, Enthesopathy, Hypercholesterolemia, Hyperlipemia, Hyperlipidemia, unspecified, Hypertension, Impaired fasting glucose, Insomnia, Loss of weight, Osteoarthritis, Osteoporosis, Other asthma, Sacrococcygeal disorders, not elsewhere classified, Shingles (11/07/2012), Unspecified osteoarthritis, unspecified site, and Vitamin D deficiency    She   Patient Active Problem List    Diagnosis Date Noted    Post-menopausal osteoporosis 09/29/2021    Acute maxillary sinusitis 08/25/2021    Dysthymic disorder 08/25/2021    Acute pain of left foot 05/19/2021    Neuroma of foot 05/19/2021    Screening for colon cancer 05/10/2021    Allergic reaction 05/10/2021    Cellulitis of left lower extremity 05/10/2021    Martinez neuroma, 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    Screening for malignant neoplasm of colon 03/24/2021    Colon cancer screening declined 03/24/2021    Mild intermittent asthma without complication 50/27/3164    Rotator cuff arthropathy, left 09/29/2020    Fall (on) (from) other stairs and steps, initial encounter 09/29/2020    Vitamin D deficiency disease 09/14/2020    Mixed hyperlipidemia 09/14/2020    Impaired fasting glucose 09/14/2020     She  has a past surgical history that includes Sinus surgery and Colonoscopy (01/23/2009)  Her family history is not on file  She  reports that she has quit smoking  She has never used smokeless tobacco  She reports previous alcohol use  No history on file for drug use  Current Outpatient Medications   Medication Sig Dispense Refill    acetaminophen (TYLENOL) 650 mg CR tablet Take 1 tablet (650 mg total) by mouth every 8 (eight) hours as needed for mild pain She prefers capsule, please give her capsules  60 tablet 1    b complex vitamins capsule Take 1 capsule by mouth daily      ergocalciferol (VITAMIN D2) 50,000 units take 1 capsule by mouth every week 12 capsule 1    meclizine (ANTIVERT) 25 mg tablet take 1 tablet by mouth every 8 hours if needed for dizziness 30 tablet 0     No current facility-administered medications for this visit  Current Outpatient Medications on File Prior to Visit   Medication Sig    acetaminophen (TYLENOL) 650 mg CR tablet Take 1 tablet (650 mg total) by mouth every 8 (eight) hours as needed for mild pain She prefers capsule, please give her capsules   b complex vitamins capsule Take 1 capsule by mouth daily    ergocalciferol (VITAMIN D2) 50,000 units take 1 capsule by mouth every week    meclizine (ANTIVERT) 25 mg tablet take 1 tablet by mouth every 8 hours if needed for dizziness    [DISCONTINUED] escitalopram (LEXAPRO) 5 mg tablet Take 1 tablet (5 mg total) by mouth daily (Patient not taking: Reported on 9/29/2021)     No current facility-administered medications on file prior to visit       She is allergic to alendronate, biaxin [clarithromycin], clindamycin, raloxifene, risedronate, and iodine - food allergy       Review of Systems   Constitutional: Negative for chills and fever  HENT: Negative for congestion, ear pain and sore throat  Eyes: Negative for pain  Respiratory: Negative for cough and shortness of breath  Cardiovascular: Negative for chest pain and leg swelling  Gastrointestinal: Negative for abdominal pain, nausea and vomiting  Endocrine: Negative for polyuria  Genitourinary: Negative for difficulty urinating, frequency and urgency  Musculoskeletal: Negative for arthralgias and back pain  Skin: Negative for rash  Neurological: Negative for weakness and headaches  Psychiatric/Behavioral: Negative for sleep disturbance  The patient is not nervous/anxious  Objective:      /82 (BP Location: Right arm, Patient Position: Sitting, Cuff Size: Standard)   Pulse 97   Temp (!) 97 3 °F (36 3 °C) (Temporal)   Ht 5' 5" (1 651 m)   Wt 66 7 kg (147 lb)   SpO2 97%   BMI 24 46 kg/m²     No results found for this or any previous visit (from the past 1344 hour(s))  Physical Exam  Constitutional:       Appearance: Normal appearance  HENT:      Head: Normocephalic  Right Ear: Tympanic membrane, ear canal and external ear normal       Left Ear: Tympanic membrane, ear canal and external ear normal       Nose: Nose normal  No congestion  Mouth/Throat:      Mouth: Mucous membranes are moist       Pharynx: Oropharynx is clear  No oropharyngeal exudate or posterior oropharyngeal erythema  Eyes:      Extraocular Movements: Extraocular movements intact  Conjunctiva/sclera: Conjunctivae normal       Pupils: Pupils are equal, round, and reactive to light  Cardiovascular:      Rate and Rhythm: Normal rate and regular rhythm  Heart sounds: Normal heart sounds  No murmur heard  Pulmonary:      Effort: Pulmonary effort is normal       Breath sounds: Normal breath sounds  No wheezing or rales     Abdominal:      General: Bowel sounds are normal  There is no distension  Palpations: Abdomen is soft  Tenderness: There is no abdominal tenderness  Musculoskeletal:         General: Normal range of motion  Cervical back: Normal range of motion and neck supple  Right lower leg: No edema  Left lower leg: No edema  Lymphadenopathy:      Cervical: No cervical adenopathy  Skin:     General: Skin is warm  Neurological:      General: No focal deficit present  Mental Status: She is alert and oriented to person, place, and time

## 2021-10-14 ENCOUNTER — IMMUNIZATIONS (OUTPATIENT)
Dept: FAMILY MEDICINE CLINIC | Facility: HOSPITAL | Age: 86
End: 2021-10-14

## 2021-10-14 DIAGNOSIS — Z23 ENCOUNTER FOR IMMUNIZATION: Primary | ICD-10-CM

## 2021-10-14 PROCEDURE — 0001A SARS-COV-2 / COVID-19 MRNA VACCINE (PFIZER-BIONTECH) 30 MCG: CPT

## 2021-10-14 PROCEDURE — 91300 SARS-COV-2 / COVID-19 MRNA VACCINE (PFIZER-BIONTECH) 30 MCG: CPT

## 2021-11-30 ENCOUNTER — NEW REFERRAL (OUTPATIENT)
Dept: URBAN - METROPOLITAN AREA CLINIC 6 | Facility: CLINIC | Age: 86
End: 2021-11-30

## 2021-11-30 DIAGNOSIS — H25.813: ICD-10-CM

## 2021-11-30 PROCEDURE — G8427 DOCREV CUR MEDS BY ELIG CLIN: HCPCS

## 2021-11-30 PROCEDURE — 92004 COMPRE OPH EXAM NEW PT 1/>: CPT

## 2021-11-30 ASSESSMENT — VISUAL ACUITY
OD_CC: 20/80
OD_GLARE: 20/400
OS_CC: 20/50-2
OS_GLARE: 20/400
OU_CC: J1+

## 2021-11-30 ASSESSMENT — TONOMETRY
OD_IOP_MMHG: 20
OS_IOP_MMHG: 10

## 2022-01-26 ENCOUNTER — PRE-OP CATARACT MEASUREMENTS (OUTPATIENT)
Dept: URBAN - METROPOLITAN AREA CLINIC 6 | Facility: CLINIC | Age: 87
End: 2022-01-26

## 2022-01-26 DIAGNOSIS — H25.813: ICD-10-CM

## 2022-01-26 PROCEDURE — 92136 OPHTHALMIC BIOMETRY: CPT

## 2022-01-26 PROCEDURE — 92012 INTRM OPH EXAM EST PATIENT: CPT

## 2022-01-26 ASSESSMENT — TONOMETRY
OD_IOP_MMHG: 21
OS_IOP_MMHG: 17
OS_IOP_MMHG: 17
OD_IOP_MMHG: 21

## 2022-01-26 ASSESSMENT — VISUAL ACUITY
OS_CC: 20/60
OS_CC: J2
OD_PH: 20/60-1
OD_CC: J5
OD_CC: 20/100
OS_PH: 20/50

## 2022-01-26 ASSESSMENT — KERATOMETRY
OD_AXISANGLE_DEGREES: 91
OS_AXISANGLE2_DEGREES: 167
OD_K2POWER_DIOPTERS: 44.50
OS_K1POWER_DIOPTERS: 43.25
OS_K2POWER_DIOPTERS: 44.25
OD_AXISANGLE2_DEGREES: 1
OS_AXISANGLE_DEGREES: 77
OD_K1POWER_DIOPTERS: 43.25

## 2022-01-28 ENCOUNTER — RA CDI HCC (OUTPATIENT)
Dept: OTHER | Facility: HOSPITAL | Age: 87
End: 2022-01-28

## 2022-01-28 NOTE — PROGRESS NOTES
[facility]  coding opportunities       Chart reviewed, no opportunity found: CHART REVIEWED, NO OPPORTUNITY FOUND                        Patients insurance company: [employer] (Medicare Advantage and Commercial)

## 2022-02-03 ENCOUNTER — CONSULT (OUTPATIENT)
Dept: INTERNAL MEDICINE CLINIC | Facility: CLINIC | Age: 87
End: 2022-02-03
Payer: COMMERCIAL

## 2022-02-03 VITALS
DIASTOLIC BLOOD PRESSURE: 76 MMHG | HEIGHT: 65 IN | SYSTOLIC BLOOD PRESSURE: 126 MMHG | BODY MASS INDEX: 24.32 KG/M2 | HEART RATE: 89 BPM | WEIGHT: 146 LBS | TEMPERATURE: 97.8 F | OXYGEN SATURATION: 97 %

## 2022-02-03 DIAGNOSIS — Z01.818 PREOP EXAM FOR INTERNAL MEDICINE: Primary | ICD-10-CM

## 2022-02-03 DIAGNOSIS — E55.9 VITAMIN D DEFICIENCY DISEASE: ICD-10-CM

## 2022-02-03 DIAGNOSIS — J01.00 ACUTE MAXILLARY SINUSITIS, RECURRENCE NOT SPECIFIED: ICD-10-CM

## 2022-02-03 DIAGNOSIS — E78.2 MIXED HYPERLIPIDEMIA: ICD-10-CM

## 2022-02-03 DIAGNOSIS — R73.01 IMPAIRED FASTING GLUCOSE: ICD-10-CM

## 2022-02-03 DIAGNOSIS — J45.20 MILD INTERMITTENT ASTHMA WITHOUT COMPLICATION: ICD-10-CM

## 2022-02-03 DIAGNOSIS — H25.013 CORTICAL AGE-RELATED CATARACT OF BOTH EYES: ICD-10-CM

## 2022-02-03 PROCEDURE — 1160F RVW MEDS BY RX/DR IN RCRD: CPT | Performed by: INTERNAL MEDICINE

## 2022-02-03 PROCEDURE — 1036F TOBACCO NON-USER: CPT | Performed by: INTERNAL MEDICINE

## 2022-02-03 PROCEDURE — 99214 OFFICE O/P EST MOD 30 MIN: CPT | Performed by: INTERNAL MEDICINE

## 2022-02-03 RX ORDER — LOTEPREDNOL ETABONATE 3.8 MG/G
GEL OPHTHALMIC
COMMUNITY
Start: 2022-01-27 | End: 2022-02-03

## 2022-02-03 RX ORDER — AZITHROMYCIN 500 MG/1
500 TABLET, FILM COATED ORAL DAILY
Qty: 3 TABLET | Refills: 1 | Status: SHIPPED | OUTPATIENT
Start: 2022-02-03 | End: 2022-02-06

## 2022-02-03 RX ORDER — BESIFLOXACIN 6 MG/ML
SUSPENSION OPHTHALMIC
COMMUNITY
Start: 2022-01-27

## 2022-02-03 NOTE — PROGRESS NOTES
Assessment/Plan:             1  Preop exam for internal medicine    2  Mild intermittent asthma without complication    3  Cortical age-related cataract of both eyes    4  Vitamin D deficiency disease  -     Vitamin D 25 hydroxy; Future    5  Acute maxillary sinusitis, recurrence not specified  -     azithromycin (ZITHROMAX) 500 MG tablet; Take 1 tablet (500 mg total) by mouth daily for 3 days    6  Impaired fasting glucose  -     CBC and differential; Future  -     Comprehensive metabolic panel; Future  -     Hemoglobin A1C; Future  -     TSH, 3rd generation; Future    7  Mixed hyperlipidemia  -     Comprehensive metabolic panel; Future  -     Lipid Panel with Direct LDL reflex; Future  -     TSH, 3rd generation; Future           Subjective:      Patient ID: Mahad Wilson is a 82 y o  female  Preop cataract surgery bilateral, cough with the yellow secretions, sore throat, sinus pressure      The following portions of the patient's history were reviewed and updated as appropriate: She  has a past medical history of Abnormal weight loss, Allergic rhinitis, Allergic rhinitis due to allergen, Anxiety disorder, Asthma, Body mass index (BMI) 22 0-22 9, adult (01/25/2017), Body mass index (BMI) 23 0-23 9, adult (08/16/2017), Body mass index (BMI) 24 0-24 9, adult (11/23/2015), COPD (chronic obstructive pulmonary disease) (HCC), Crushing injury of multiple sites of trunk, Disorder of rotator cuff, left, Disorder of sacrum, Elevated blood pressure reading without diagnosis of hypertension, Enthesopathy, Hypercholesterolemia, Hyperlipemia, Hyperlipidemia, unspecified, Hypertension, Impaired fasting glucose, Insomnia, Loss of weight, Osteoarthritis, Osteoporosis, Other asthma, Sacrococcygeal disorders, not elsewhere classified, Shingles (11/07/2012), Unspecified osteoarthritis, unspecified site, and Vitamin D deficiency    She   Patient Active Problem List    Diagnosis Date Noted    Cortical age-related cataract of both eyes 02/03/2022    Post-menopausal osteoporosis 09/29/2021    Acute maxillary sinusitis 08/25/2021    Dysthymic disorder 08/25/2021    Acute pain of left foot 05/19/2021    Neuroma of foot 05/19/2021    Preop exam for internal medicine 05/10/2021    Allergic reaction 05/10/2021    Cellulitis of left lower extremity 05/10/2021    Martinez neuroma, 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    Screening for malignant neoplasm of colon 03/24/2021    Colon cancer screening declined 03/24/2021    Mild intermittent asthma without complication 05/35/0049    Rotator cuff arthropathy, left 09/29/2020    Fall (on) (from) other stairs and steps, initial encounter 09/29/2020    Vitamin D deficiency disease 09/14/2020    Mixed hyperlipidemia 09/14/2020    Impaired fasting glucose 09/14/2020     She  has a past surgical history that includes Sinus surgery and Colonoscopy (01/23/2009)  Her family history is not on file  She  reports that she has quit smoking  She has never used smokeless tobacco  She reports previous alcohol use  No history on file for drug use  Current Outpatient Medications   Medication Sig Dispense Refill    acetaminophen (TYLENOL) 650 mg CR tablet Take 1 tablet (650 mg total) by mouth every 8 (eight) hours as needed for mild pain She prefers capsule, please give her capsules  60 tablet 1    b complex vitamins capsule Take 1 capsule by mouth daily      ergocalciferol (VITAMIN D2) 50,000 units take 1 capsule by mouth every week 12 capsule 1    meclizine (ANTIVERT) 25 mg tablet take 1 tablet by mouth every 8 hours if needed for dizziness 30 tablet 0    azithromycin (ZITHROMAX) 500 MG tablet Take 1 tablet (500 mg total) by mouth daily for 3 days 3 tablet 1    Besivance 0 6 % SUSP  (Patient not taking: Reported on 2/3/2022 )       No current facility-administered medications for this visit       Current Outpatient Medications on File Prior to Visit   Medication Sig    acetaminophen (TYLENOL) 650 mg CR tablet Take 1 tablet (650 mg total) by mouth every 8 (eight) hours as needed for mild pain She prefers capsule, please give her capsules   b complex vitamins capsule Take 1 capsule by mouth daily    ergocalciferol (VITAMIN D2) 50,000 units take 1 capsule by mouth every week    meclizine (ANTIVERT) 25 mg tablet take 1 tablet by mouth every 8 hours if needed for dizziness    Besivance 0 6 % SUSP  (Patient not taking: Reported on 2/3/2022 )    [DISCONTINUED] Lotemax SM 0 38 % GEL  (Patient not taking: Reported on 2/3/2022 )     No current facility-administered medications on file prior to visit  She is allergic to alendronate, biaxin [clarithromycin], clindamycin, raloxifene, risedronate, and iodine - food allergy       Review of Systems   Constitutional: Negative for chills and fever  HENT: Positive for sore throat  Negative for congestion and ear pain  Eyes: Negative for pain  Respiratory: Positive for cough  Negative for shortness of breath  Cardiovascular: Negative for chest pain and leg swelling  Gastrointestinal: Negative for abdominal pain, nausea and vomiting  Endocrine: Negative for polyuria  Genitourinary: Negative for difficulty urinating, frequency and urgency  Musculoskeletal: Negative for arthralgias and back pain  Skin: Negative for rash  Neurological: Negative for weakness and headaches  Psychiatric/Behavioral: Negative for sleep disturbance  The patient is not nervous/anxious  Objective:      /76 (BP Location: Right arm, Patient Position: Sitting, Cuff Size: Standard)   Pulse 89   Temp 97 8 °F (36 6 °C) (Temporal)   Ht 5' 5" (1 651 m)   Wt 66 2 kg (146 lb)   SpO2 97%   BMI 24 30 kg/m²     No results found for this or any previous visit (from the past 1344 hour(s))  Physical Exam  Constitutional:       Appearance: Normal appearance  HENT:      Head: Normocephalic        Right Ear: Tympanic membrane, ear canal and external ear normal       Left Ear: Tympanic membrane, ear canal and external ear normal       Nose: Nose normal  No congestion  Mouth/Throat:      Mouth: Mucous membranes are moist       Pharynx: Oropharynx is clear  No oropharyngeal exudate or posterior oropharyngeal erythema  Eyes:      Extraocular Movements: Extraocular movements intact  Conjunctiva/sclera: Conjunctivae normal       Pupils: Pupils are equal, round, and reactive to light  Cardiovascular:      Rate and Rhythm: Normal rate and regular rhythm  Heart sounds: Normal heart sounds  No murmur heard  Pulmonary:      Effort: Pulmonary effort is normal       Breath sounds: Normal breath sounds  No wheezing or rales  Abdominal:      General: Bowel sounds are normal  There is no distension  Palpations: Abdomen is soft  Tenderness: There is no abdominal tenderness  Musculoskeletal:         General: Normal range of motion  Cervical back: Normal range of motion and neck supple  Right lower leg: No edema  Left lower leg: No edema  Lymphadenopathy:      Cervical: No cervical adenopathy  Skin:     General: Skin is warm  Neurological:      General: No focal deficit present  Mental Status: She is alert and oriented to person, place, and time

## 2022-02-10 ENCOUNTER — SURGERY/PROCEDURE (OUTPATIENT)
Dept: URBAN - METROPOLITAN AREA SURGICAL CENTER 6 | Facility: SURGICAL CENTER | Age: 87
End: 2022-02-10

## 2022-02-10 DIAGNOSIS — H25.811: ICD-10-CM

## 2022-02-10 PROCEDURE — 66984 XCAPSL CTRC RMVL W/O ECP: CPT

## 2022-02-11 ENCOUNTER — 1 DAY POST-OP (OUTPATIENT)
Dept: URBAN - METROPOLITAN AREA CLINIC 6 | Facility: CLINIC | Age: 87
End: 2022-02-11

## 2022-02-11 DIAGNOSIS — Z96.1: ICD-10-CM

## 2022-02-11 PROCEDURE — 99024 POSTOP FOLLOW-UP VISIT: CPT

## 2022-02-11 ASSESSMENT — KERATOMETRY
OS_K1POWER_DIOPTERS: 43.25
OD_K1POWER_DIOPTERS: 43.25
OD_K2POWER_DIOPTERS: 44.50
OS_K2POWER_DIOPTERS: 44.25
OD_AXISANGLE_DEGREES: 91
OD_K1POWER_DIOPTERS: 43.25
OS_AXISANGLE_DEGREES: 77
OD_K2POWER_DIOPTERS: 44.50
OD_AXISANGLE2_DEGREES: 1
OS_K1POWER_DIOPTERS: 43.25
OD_AXISANGLE_DEGREES: 91
OS_AXISANGLE2_DEGREES: 167
OS_AXISANGLE_DEGREES: 77
OS_AXISANGLE2_DEGREES: 167
OS_K2POWER_DIOPTERS: 44.25
OD_AXISANGLE2_DEGREES: 1

## 2022-02-11 ASSESSMENT — VISUAL ACUITY
OS_SC: 20/60
OD_SC: 20/30-1

## 2022-02-11 ASSESSMENT — TONOMETRY
OD_IOP_MMHG: 27
OS_IOP_MMHG: 20

## 2022-02-16 DIAGNOSIS — E55.9 VITAMIN D DEFICIENCY DISEASE: ICD-10-CM

## 2022-02-16 RX ORDER — ERGOCALCIFEROL 1.25 MG/1
CAPSULE ORAL
Qty: 12 CAPSULE | Refills: 1 | Status: SHIPPED | OUTPATIENT
Start: 2022-02-16

## 2022-02-18 ENCOUNTER — 1 WEEK POST-OP (OUTPATIENT)
Dept: URBAN - METROPOLITAN AREA CLINIC 6 | Facility: CLINIC | Age: 87
End: 2022-02-18

## 2022-02-18 DIAGNOSIS — Z96.1: ICD-10-CM

## 2022-02-18 DIAGNOSIS — H25.812: ICD-10-CM

## 2022-02-18 PROCEDURE — 99024 POSTOP FOLLOW-UP VISIT: CPT

## 2022-02-18 PROCEDURE — 92136 OPHTHALMIC BIOMETRY: CPT

## 2022-02-18 ASSESSMENT — VISUAL ACUITY
OS_GLARE: 20/200
OD_OTHER: 1 WEEK POST OP
OS_SC: 20/70
OD_SC: 20/25

## 2022-02-18 ASSESSMENT — TONOMETRY
OS_IOP_MMHG: 17
OD_IOP_MMHG: 16

## 2022-02-18 ASSESSMENT — KERATOMETRY
OD_AXISANGLE_DEGREES: 91
OS_K2POWER_DIOPTERS: 44.25
OS_AXISANGLE_DEGREES: 77
OS_AXISANGLE2_DEGREES: 167
OD_K1POWER_DIOPTERS: 43.25
OS_K1POWER_DIOPTERS: 43.25
OD_K2POWER_DIOPTERS: 44.50
OD_AXISANGLE2_DEGREES: 1

## 2022-02-28 ENCOUNTER — APPOINTMENT (OUTPATIENT)
Dept: LAB | Facility: HOSPITAL | Age: 87
End: 2022-02-28
Attending: INTERNAL MEDICINE
Payer: COMMERCIAL

## 2022-02-28 ENCOUNTER — OFFICE VISIT (OUTPATIENT)
Dept: INTERNAL MEDICINE CLINIC | Facility: CLINIC | Age: 87
End: 2022-02-28
Payer: COMMERCIAL

## 2022-02-28 VITALS
WEIGHT: 148.9 LBS | BODY MASS INDEX: 24.81 KG/M2 | HEART RATE: 92 BPM | DIASTOLIC BLOOD PRESSURE: 86 MMHG | TEMPERATURE: 98.4 F | SYSTOLIC BLOOD PRESSURE: 136 MMHG | OXYGEN SATURATION: 96 % | HEIGHT: 65 IN

## 2022-02-28 DIAGNOSIS — E55.9 VITAMIN D DEFICIENCY DISEASE: ICD-10-CM

## 2022-02-28 DIAGNOSIS — L50.9 URTICARIA: Primary | ICD-10-CM

## 2022-02-28 DIAGNOSIS — E78.2 MIXED HYPERLIPIDEMIA: ICD-10-CM

## 2022-02-28 DIAGNOSIS — R73.01 IMPAIRED FASTING GLUCOSE: ICD-10-CM

## 2022-02-28 DIAGNOSIS — R21 RASH: ICD-10-CM

## 2022-02-28 LAB
ALBUMIN SERPL BCP-MCNC: 3.8 G/DL (ref 3.5–5)
ALP SERPL-CCNC: 94 U/L (ref 46–116)
ALT SERPL W P-5'-P-CCNC: 18 U/L (ref 12–78)
ANION GAP SERPL CALCULATED.3IONS-SCNC: 7 MMOL/L (ref 4–13)
AST SERPL W P-5'-P-CCNC: 16 U/L (ref 5–45)
BASOPHILS # BLD AUTO: 0.04 THOUSANDS/ΜL (ref 0–0.1)
BASOPHILS NFR BLD AUTO: 1 % (ref 0–1)
BILIRUB SERPL-MCNC: 0.85 MG/DL (ref 0.2–1)
BUN SERPL-MCNC: 16 MG/DL (ref 5–25)
CALCIUM SERPL-MCNC: 9.7 MG/DL (ref 8.3–10.1)
CHLORIDE SERPL-SCNC: 110 MMOL/L (ref 100–108)
CO2 SERPL-SCNC: 25 MMOL/L (ref 21–32)
CREAT SERPL-MCNC: 0.82 MG/DL (ref 0.6–1.3)
EOSINOPHIL # BLD AUTO: 0.22 THOUSAND/ΜL (ref 0–0.61)
EOSINOPHIL NFR BLD AUTO: 3 % (ref 0–6)
ERYTHROCYTE [DISTWIDTH] IN BLOOD BY AUTOMATED COUNT: 13.6 % (ref 11.6–15.1)
GFR SERPL CREATININE-BSD FRML MDRD: 65 ML/MIN/1.73SQ M
GLUCOSE SERPL-MCNC: 112 MG/DL (ref 65–140)
HCT VFR BLD AUTO: 44.6 % (ref 34.8–46.1)
HGB BLD-MCNC: 14.3 G/DL (ref 11.5–15.4)
IMM GRANULOCYTES # BLD AUTO: 0.01 THOUSAND/UL (ref 0–0.2)
IMM GRANULOCYTES NFR BLD AUTO: 0 % (ref 0–2)
LYMPHOCYTES # BLD AUTO: 1.93 THOUSANDS/ΜL (ref 0.6–4.47)
LYMPHOCYTES NFR BLD AUTO: 25 % (ref 14–44)
MCH RBC QN AUTO: 29.3 PG (ref 26.8–34.3)
MCHC RBC AUTO-ENTMCNC: 32.1 G/DL (ref 31.4–37.4)
MCV RBC AUTO: 91 FL (ref 82–98)
MONOCYTES # BLD AUTO: 0.68 THOUSAND/ΜL (ref 0.17–1.22)
MONOCYTES NFR BLD AUTO: 9 % (ref 4–12)
NEUTROPHILS # BLD AUTO: 4.76 THOUSANDS/ΜL (ref 1.85–7.62)
NEUTS SEG NFR BLD AUTO: 62 % (ref 43–75)
NRBC BLD AUTO-RTO: 0 /100 WBCS
PLATELET # BLD AUTO: 374 THOUSANDS/UL (ref 149–390)
PMV BLD AUTO: 10.1 FL (ref 8.9–12.7)
POTASSIUM SERPL-SCNC: 3.4 MMOL/L (ref 3.5–5.3)
PROT SERPL-MCNC: 7.9 G/DL (ref 6.4–8.2)
RBC # BLD AUTO: 4.88 MILLION/UL (ref 3.81–5.12)
SODIUM SERPL-SCNC: 142 MMOL/L (ref 136–145)
WBC # BLD AUTO: 7.64 THOUSAND/UL (ref 4.31–10.16)

## 2022-02-28 PROCEDURE — 85025 COMPLETE CBC W/AUTO DIFF WBC: CPT

## 2022-02-28 PROCEDURE — 99213 OFFICE O/P EST LOW 20 MIN: CPT | Performed by: INTERNAL MEDICINE

## 2022-02-28 PROCEDURE — 80053 COMPREHEN METABOLIC PANEL: CPT

## 2022-02-28 PROCEDURE — 36415 COLL VENOUS BLD VENIPUNCTURE: CPT

## 2022-02-28 RX ORDER — LOTEPREDNOL ETABONATE 3.8 MG/G
GEL OPHTHALMIC
COMMUNITY
Start: 2022-02-22

## 2022-02-28 RX ORDER — METHYLPREDNISOLONE ACETATE 80 MG/ML
80 INJECTION, SUSPENSION INTRA-ARTICULAR; INTRALESIONAL; INTRAMUSCULAR; SOFT TISSUE ONCE
Status: CANCELLED | OUTPATIENT
Start: 2022-02-28 | End: 2022-02-28

## 2022-02-28 NOTE — PROGRESS NOTES
Assessment/Plan:      80 mg Depo-Medrol injected intramuscularly ( 0 5 cc on each deltoid-40 mg)        1  Urticaria    2  Rash  -     CBC and differential; Future  -     Comprehensive metabolic panel; Future           Subjective:      Patient ID: Nathan Chong is a 82 y o  female  Rash bilateral arm, itching and also on the left lower leg, itching, not sure of any new cream or detergent, no shortness of breath no cough no sore throat, no abdominal pain no nausea no vomiting, no fever no chills, no joint pain      The following portions of the patient's history were reviewed and updated as appropriate: She  has a past medical history of Abnormal weight loss, Allergic rhinitis, Allergic rhinitis due to allergen, Anxiety disorder, Asthma, Body mass index (BMI) 22 0-22 9, adult (01/25/2017), Body mass index (BMI) 23 0-23 9, adult (08/16/2017), Body mass index (BMI) 24 0-24 9, adult (11/23/2015), COPD (chronic obstructive pulmonary disease) ([facility]), Crushing injury of multiple sites of trunk, Disorder of rotator cuff, left, Disorder of sacrum, Elevated blood pressure reading without diagnosis of hypertension, Enthesopathy, Hypercholesterolemia, Hyperlipemia, Hyperlipidemia, unspecified, Hypertension, Impaired fasting glucose, Insomnia, Loss of weight, Osteoarthritis, Osteoporosis, Other asthma, Sacrococcygeal disorders, not elsewhere classified, Shingles (11/07/2012), Unspecified osteoarthritis, unspecified site, and Vitamin D deficiency    She   Patient Active Problem List    Diagnosis Date Noted    Urticaria 02/28/2022    Rash 02/28/2022    Cortical age-related cataract of both eyes 02/03/2022    Post-menopausal osteoporosis 09/29/2021    Acute maxillary sinusitis 08/25/2021    Dysthymic disorder 08/25/2021    Acute pain of left foot 05/19/2021    Neuroma of foot 05/19/2021    Preop exam for internal medicine 05/10/2021    Allergic reaction 05/10/2021    Cellulitis of left lower extremity 05/10/2021   Virgen Young neuroma, 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    Screening for malignant neoplasm of colon 03/24/2021    Colon cancer screening declined 03/24/2021    Mild intermittent asthma without complication 62/93/7373    Rotator cuff arthropathy, left 09/29/2020    Fall (on) (from) other stairs and steps, initial encounter 09/29/2020    Vitamin D deficiency disease 09/14/2020    Mixed hyperlipidemia 09/14/2020    Impaired fasting glucose 09/14/2020     She  has a past surgical history that includes Sinus surgery and Colonoscopy (01/23/2009)  Her family history is not on file  She  reports that she has quit smoking  She has never used smokeless tobacco  She reports previous alcohol use  No history on file for drug use  Current Outpatient Medications   Medication Sig Dispense Refill    acetaminophen (TYLENOL) 650 mg CR tablet Take 1 tablet (650 mg total) by mouth every 8 (eight) hours as needed for mild pain She prefers capsule, please give her capsules  60 tablet 1    b complex vitamins capsule Take 1 capsule by mouth daily      Besivance 0 6 % SUSP        ergocalciferol (VITAMIN D2) 50,000 units take 1 capsule by mouth every week 12 capsule 1    Lotemax SM 0 38 % GEL       meclizine (ANTIVERT) 25 mg tablet take 1 tablet by mouth every 8 hours if needed for dizziness 30 tablet 0     No current facility-administered medications for this visit  Current Outpatient Medications on File Prior to Visit   Medication Sig    acetaminophen (TYLENOL) 650 mg CR tablet Take 1 tablet (650 mg total) by mouth every 8 (eight) hours as needed for mild pain She prefers capsule, please give her capsules      b complex vitamins capsule Take 1 capsule by mouth daily    Besivance 0 6 % SUSP      ergocalciferol (VITAMIN D2) 50,000 units take 1 capsule by mouth every week    Lotemax SM 0 38 % GEL     meclizine (ANTIVERT) 25 mg tablet take 1 tablet by mouth every 8 hours if needed for dizziness     No current facility-administered medications on file prior to visit  She is allergic to alendronate, biaxin [clarithromycin], clindamycin, raloxifene, risedronate, and iodine - food allergy       Review of Systems   Constitutional: Negative for chills and fever  HENT: Negative for congestion, ear pain and sore throat  Eyes: Negative for pain  Respiratory: Negative for cough and shortness of breath  Cardiovascular: Negative for chest pain and leg swelling  Gastrointestinal: Negative for abdominal pain, nausea and vomiting  Endocrine: Negative for polyuria  Genitourinary: Negative for difficulty urinating, frequency and urgency  Musculoskeletal: Negative for arthralgias and back pain  Skin: Positive for rash  Neurological: Negative for weakness and headaches  Psychiatric/Behavioral: Negative for sleep disturbance  The patient is not nervous/anxious  Objective:      /86 (BP Location: Left arm, Patient Position: Sitting, Cuff Size: Adult)   Pulse 92   Temp 98 4 °F (36 9 °C) (Temporal)   Ht 5' 5" (1 651 m)   Wt 67 5 kg (148 lb 14 4 oz)   SpO2 96%   BMI 24 78 kg/m²     No results found for this or any previous visit (from the past 1344 hour(s))  Physical Exam  Constitutional:       Appearance: Normal appearance  HENT:      Head: Normocephalic  Right Ear: Tympanic membrane, ear canal and external ear normal       Left Ear: Tympanic membrane, ear canal and external ear normal       Nose: Nose normal  No congestion  Mouth/Throat:      Mouth: Mucous membranes are moist       Pharynx: Oropharynx is clear  No oropharyngeal exudate or posterior oropharyngeal erythema  Eyes:      Extraocular Movements: Extraocular movements intact  Conjunctiva/sclera: Conjunctivae normal       Pupils: Pupils are equal, round, and reactive to light  Cardiovascular:      Rate and Rhythm: Normal rate and regular rhythm  Heart sounds: Normal heart sounds  No murmur heard        Pulmonary:      Effort: Pulmonary effort is normal       Breath sounds: Normal breath sounds  No wheezing or rales  Abdominal:      General: Bowel sounds are normal  There is no distension  Palpations: Abdomen is soft  Tenderness: There is no abdominal tenderness  Musculoskeletal:         General: Normal range of motion  Cervical back: Normal range of motion and neck supple  Right lower leg: No edema  Left lower leg: No edema  Lymphadenopathy:      Cervical: No cervical adenopathy  Skin:     General: Skin is warm  Comments: Left hand dorsum skin redness and swelling present    Left forearm skin redness and swelling present    Left ankle joint laterally skin redness and swelling present   Neurological:      General: No focal deficit present  Mental Status: She is alert and oriented to person, place, and time

## 2022-03-02 ENCOUNTER — TELEPHONE (OUTPATIENT)
Dept: INTERNAL MEDICINE CLINIC | Facility: CLINIC | Age: 87
End: 2022-03-02

## 2022-03-02 NOTE — TELEPHONE ENCOUNTER
Pt called to let you know that she did not have any problem with the shot and the swelling and the itching has gone down

## 2022-03-03 ENCOUNTER — SURGERY/PROCEDURE (OUTPATIENT)
Dept: URBAN - METROPOLITAN AREA SURGICAL CENTER 6 | Facility: SURGICAL CENTER | Age: 87
End: 2022-03-03

## 2022-03-03 DIAGNOSIS — H25.812: ICD-10-CM

## 2022-03-03 PROCEDURE — 66984 XCAPSL CTRC RMVL W/O ECP: CPT | Mod: 79,LT

## 2022-03-04 ENCOUNTER — 1 DAY POST-OP (OUTPATIENT)
Dept: URBAN - METROPOLITAN AREA CLINIC 6 | Facility: CLINIC | Age: 87
End: 2022-03-04

## 2022-03-04 DIAGNOSIS — Z96.1: ICD-10-CM

## 2022-03-04 PROCEDURE — 99024 POSTOP FOLLOW-UP VISIT: CPT

## 2022-03-04 ASSESSMENT — KERATOMETRY
OD_K1POWER_DIOPTERS: 43.25
OD_AXISANGLE2_DEGREES: 1
OS_K1POWER_DIOPTERS: 43.25
OS_AXISANGLE2_DEGREES: 167
OS_AXISANGLE_DEGREES: 77
OD_AXISANGLE_DEGREES: 91
OD_K2POWER_DIOPTERS: 44.50
OS_K2POWER_DIOPTERS: 44.25

## 2022-03-04 ASSESSMENT — VISUAL ACUITY
OS_PH: 20/30+1
OD_SC: 20/25
OS_SC: 20/40-2

## 2022-03-04 ASSESSMENT — TONOMETRY
OD_IOP_MMHG: 18
OS_IOP_MMHG: 24

## 2022-03-07 ASSESSMENT — KERATOMETRY
OS_AXISANGLE2_DEGREES: 167
OD_K1POWER_DIOPTERS: 43.25
OS_AXISANGLE_DEGREES: 77
OD_AXISANGLE2_DEGREES: 1
OS_K1POWER_DIOPTERS: 43.25
OS_K2POWER_DIOPTERS: 44.25
OD_K2POWER_DIOPTERS: 44.50
OD_AXISANGLE_DEGREES: 91

## 2022-03-09 ENCOUNTER — RA CDI HCC (OUTPATIENT)
Dept: OTHER | Facility: HOSPITAL | Age: 87
End: 2022-03-09

## 2022-03-09 NOTE — PROGRESS NOTES
[facility]  coding opportunities       Chart reviewed, no opportunity found: CHART REVIEWED, NO OPPORTUNITY FOUND                        Patients insurance company:  [employer] [facility] (Medicare Advantage and Commercial)

## 2022-03-15 ENCOUNTER — PROBLEM (OUTPATIENT)
Dept: URBAN - METROPOLITAN AREA CLINIC 6 | Facility: CLINIC | Age: 87
End: 2022-03-15

## 2022-03-15 DIAGNOSIS — Z96.1: ICD-10-CM

## 2022-03-15 DIAGNOSIS — H04.123: ICD-10-CM

## 2022-03-15 PROCEDURE — 99024 POSTOP FOLLOW-UP VISIT: CPT

## 2022-03-15 ASSESSMENT — VISUAL ACUITY
OD_SC: 20/30
OS_SC: 20/40
OU_SC: J1

## 2022-03-15 ASSESSMENT — KERATOMETRY
OS_AXISANGLE_DEGREES: 77
OS_K2POWER_DIOPTERS: 44.25
OD_AXISANGLE_DEGREES: 91
OS_K1POWER_DIOPTERS: 43.25
OS_AXISANGLE2_DEGREES: 167
OD_K2POWER_DIOPTERS: 44.50
OD_AXISANGLE2_DEGREES: 1
OD_K1POWER_DIOPTERS: 43.25

## 2022-03-15 ASSESSMENT — TONOMETRY
OD_IOP_MMHG: 17
OD_IOP_MMHG: 18
OS_IOP_MMHG: 20
OS_IOP_MMHG: 18

## 2022-03-18 ENCOUNTER — APPOINTMENT (OUTPATIENT)
Dept: LAB | Facility: HOSPITAL | Age: 87
End: 2022-03-18
Attending: INTERNAL MEDICINE
Payer: COMMERCIAL

## 2022-03-18 ENCOUNTER — 3 WEEK POST-OP (OUTPATIENT)
Dept: URBAN - METROPOLITAN AREA CLINIC 6 | Facility: CLINIC | Age: 87
End: 2022-03-18

## 2022-03-18 DIAGNOSIS — Z96.1: ICD-10-CM

## 2022-03-18 DIAGNOSIS — H04.123: ICD-10-CM

## 2022-03-18 LAB
25(OH)D3 SERPL-MCNC: 99.6 NG/ML (ref 30–100)
CHOLEST SERPL-MCNC: 247 MG/DL
EST. AVERAGE GLUCOSE BLD GHB EST-MCNC: 108 MG/DL
HBA1C MFR BLD: 5.4 %
HDLC SERPL-MCNC: 94 MG/DL
LDLC SERPL CALC-MCNC: 139 MG/DL (ref 0–100)
TRIGL SERPL-MCNC: 70 MG/DL
TSH SERPL DL<=0.05 MIU/L-ACNC: 2.69 UIU/ML (ref 0.36–3.74)

## 2022-03-18 PROCEDURE — 80061 LIPID PANEL: CPT

## 2022-03-18 PROCEDURE — 36415 COLL VENOUS BLD VENIPUNCTURE: CPT

## 2022-03-18 PROCEDURE — 99024 POSTOP FOLLOW-UP VISIT: CPT

## 2022-03-18 PROCEDURE — 82306 VITAMIN D 25 HYDROXY: CPT

## 2022-03-18 PROCEDURE — 84443 ASSAY THYROID STIM HORMONE: CPT

## 2022-03-18 PROCEDURE — 83036 HEMOGLOBIN GLYCOSYLATED A1C: CPT

## 2022-03-18 ASSESSMENT — KERATOMETRY
OS_K1POWER_DIOPTERS: 43.25
OS_AXISANGLE2_DEGREES: 167
OS_K2POWER_DIOPTERS: 44.25
OD_AXISANGLE_DEGREES: 91
OD_AXISANGLE2_DEGREES: 1
OS_AXISANGLE_DEGREES: 77
OD_K2POWER_DIOPTERS: 44.50
OD_K1POWER_DIOPTERS: 43.25

## 2022-03-18 ASSESSMENT — VISUAL ACUITY
OS_PH: 20/30-1
OD_SC: 20/30
OS_SC: 20/60-1

## 2022-03-18 ASSESSMENT — TONOMETRY
OD_IOP_MMHG: 13
OS_IOP_MMHG: 15

## 2022-03-23 ENCOUNTER — OFFICE VISIT (OUTPATIENT)
Dept: INTERNAL MEDICINE CLINIC | Facility: CLINIC | Age: 87
End: 2022-03-23
Payer: COMMERCIAL

## 2022-03-23 VITALS
BODY MASS INDEX: 24.32 KG/M2 | HEIGHT: 65 IN | SYSTOLIC BLOOD PRESSURE: 142 MMHG | WEIGHT: 146 LBS | HEART RATE: 98 BPM | OXYGEN SATURATION: 97 % | TEMPERATURE: 97.5 F | DIASTOLIC BLOOD PRESSURE: 78 MMHG

## 2022-03-23 DIAGNOSIS — E55.9 VITAMIN D DEFICIENCY DISEASE: ICD-10-CM

## 2022-03-23 DIAGNOSIS — J45.20 MILD INTERMITTENT ASTHMA WITHOUT COMPLICATION: ICD-10-CM

## 2022-03-23 DIAGNOSIS — R73.01 IMPAIRED FASTING GLUCOSE: ICD-10-CM

## 2022-03-23 DIAGNOSIS — E78.2 MIXED HYPERLIPIDEMIA: Primary | ICD-10-CM

## 2022-03-23 DIAGNOSIS — R42 DIZZINESS: ICD-10-CM

## 2022-03-23 PROCEDURE — 1160F RVW MEDS BY RX/DR IN RCRD: CPT | Performed by: INTERNAL MEDICINE

## 2022-03-23 PROCEDURE — 99214 OFFICE O/P EST MOD 30 MIN: CPT | Performed by: INTERNAL MEDICINE

## 2022-03-23 PROCEDURE — 1036F TOBACCO NON-USER: CPT | Performed by: INTERNAL MEDICINE

## 2022-03-23 RX ORDER — MECLIZINE HYDROCHLORIDE 25 MG/1
25 TABLET ORAL EVERY 8 HOURS PRN
Qty: 30 TABLET | Refills: 0 | Status: SHIPPED | OUTPATIENT
Start: 2022-03-23

## 2022-03-23 NOTE — PROGRESS NOTES
Assessment/Plan:             1  Mixed hyperlipidemia    2  Dizziness  -     meclizine (ANTIVERT) 25 mg tablet; Take 1 tablet (25 mg total) by mouth every 8 (eight) hours as needed for dizziness    3  Impaired fasting glucose    4  Mild intermittent asthma without complication    5  Vitamin D deficiency disease           Subjective:      Patient ID: Trent Aquino is a 82 y o  female  Blood test done 3/18/2022- discussed with her      The following portions of the patient's history were reviewed and updated as appropriate: She  has a past medical history of Abnormal weight loss, Allergic rhinitis, Allergic rhinitis due to allergen, Anxiety disorder, Asthma, Body mass index (BMI) 22 0-22 9, adult (01/25/2017), Body mass index (BMI) 23 0-23 9, adult (08/16/2017), Body mass index (BMI) 24 0-24 9, adult (11/23/2015), COPD (chronic obstructive pulmonary disease) ([facility]), Crushing injury of multiple sites of trunk, Disorder of rotator cuff, left, Disorder of sacrum, Elevated blood pressure reading without diagnosis of hypertension, Enthesopathy, Hypercholesterolemia, Hyperlipemia, Hyperlipidemia, unspecified, Hypertension, Impaired fasting glucose, Insomnia, Loss of weight, Osteoarthritis, Osteoporosis, Other asthma, Sacrococcygeal disorders, not elsewhere classified, Shingles (11/07/2012), Unspecified osteoarthritis, unspecified site, and Vitamin D deficiency    She   Patient Active Problem List    Diagnosis Date Noted    Dizziness 03/23/2022    Urticaria 02/28/2022    Rash 02/28/2022    Cortical age-related cataract of both eyes 02/03/2022    Post-menopausal osteoporosis 09/29/2021    Acute maxillary sinusitis 08/25/2021    Dysthymic disorder 08/25/2021    Acute pain of left foot 05/19/2021    Neuroma of foot 05/19/2021    Preop exam for internal medicine 05/10/2021    Allergic reaction 05/10/2021    Cellulitis of left lower extremity 05/10/2021    Martinez neuroma, 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    Screening for malignant neoplasm of colon 03/24/2021    Colon cancer screening declined 03/24/2021    Mild intermittent asthma without complication 95/99/4250    Rotator cuff arthropathy, left 09/29/2020    Fall (on) (from) other stairs and steps, initial encounter 09/29/2020    Vitamin D deficiency disease 09/14/2020    Mixed hyperlipidemia 09/14/2020    Impaired fasting glucose 09/14/2020     She  has a past surgical history that includes Sinus surgery and Colonoscopy (01/23/2009)  Her family history is not on file  She  reports that she has quit smoking  She has never used smokeless tobacco  She reports previous alcohol use  No history on file for drug use  Current Outpatient Medications   Medication Sig Dispense Refill    acetaminophen (TYLENOL) 650 mg CR tablet Take 1 tablet (650 mg total) by mouth every 8 (eight) hours as needed for mild pain She prefers capsule, please give her capsules  60 tablet 1    b complex vitamins capsule Take 1 capsule by mouth daily      ergocalciferol (VITAMIN D2) 50,000 units take 1 capsule by mouth every week 12 capsule 1    Lotemax SM 0 38 % GEL       meclizine (ANTIVERT) 25 mg tablet Take 1 tablet (25 mg total) by mouth every 8 (eight) hours as needed for dizziness 30 tablet 0    Besivance 0 6 % SUSP   (Patient not taking: Reported on 3/23/2022 )       No current facility-administered medications for this visit  Current Outpatient Medications on File Prior to Visit   Medication Sig    acetaminophen (TYLENOL) 650 mg CR tablet Take 1 tablet (650 mg total) by mouth every 8 (eight) hours as needed for mild pain She prefers capsule, please give her capsules      b complex vitamins capsule Take 1 capsule by mouth daily    ergocalciferol (VITAMIN D2) 50,000 units take 1 capsule by mouth every week    Lotemax SM 0 38 % GEL     [DISCONTINUED] meclizine (ANTIVERT) 25 mg tablet take 1 tablet by mouth every 8 hours if needed for dizziness    Besivance 0 6 % SUSP   (Patient not taking: Reported on 3/23/2022 )     No current facility-administered medications on file prior to visit  She is allergic to alendronate, biaxin [clarithromycin], clindamycin, raloxifene, risedronate, and iodine - food allergy       Review of Systems   Constitutional: Negative for chills and fever  HENT: Negative for congestion, ear pain and sore throat  Eyes: Negative for pain  Respiratory: Negative for cough and shortness of breath  Cardiovascular: Negative for chest pain and leg swelling  Gastrointestinal: Negative for abdominal pain, nausea and vomiting  Endocrine: Negative for polyuria  Genitourinary: Negative for difficulty urinating, frequency and urgency  Musculoskeletal: Negative for arthralgias and back pain  Skin: Negative for rash  Neurological: Negative for weakness and headaches  Psychiatric/Behavioral: Negative for sleep disturbance  The patient is not nervous/anxious            Objective:      /78 (BP Location: Right arm, Patient Position: Sitting, Cuff Size: Adult)   Pulse 98   Temp 97 5 °F (36 4 °C) (Temporal)   Ht 5' 5" (1 651 m)   Wt 66 2 kg (146 lb)   SpO2 97%   BMI 24 30 kg/m²     Recent Results (from the past 1344 hour(s))   CBC and differential    Collection Time: 02/28/22 11:02 AM   Result Value Ref Range    WBC 7 64 4 31 - 10 16 Thousand/uL    RBC 4 88 3 81 - 5 12 Million/uL    Hemoglobin 14 3 11 5 - 15 4 g/dL    Hematocrit 44 6 34 8 - 46 1 %    MCV 91 82 - 98 fL    MCH 29 3 26 8 - 34 3 pg    MCHC 32 1 31 4 - 37 4 g/dL    RDW 13 6 11 6 - 15 1 %    MPV 10 1 8 9 - 12 7 fL    Platelets 132 236 - 262 Thousands/uL    nRBC 0 /100 WBCs    Neutrophils Relative 62 43 - 75 %    Immat GRANS % 0 0 - 2 %    Lymphocytes Relative 25 14 - 44 %    Monocytes Relative 9 4 - 12 %    Eosinophils Relative 3 0 - 6 %    Basophils Relative 1 0 - 1 %    Neutrophils Absolute 4 76 1 85 - 7 62 Thousands/µL    Immature Grans Absolute 0 01 0 00 - 0 20 Thousand/uL    Lymphocytes Absolute 1 93 0 60 - 4 47 Thousands/µL    Monocytes Absolute 0 68 0 17 - 1 22 Thousand/µL    Eosinophils Absolute 0 22 0 00 - 0 61 Thousand/µL    Basophils Absolute 0 04 0 00 - 0 10 Thousands/µL   Comprehensive metabolic panel    Collection Time: 02/28/22 11:02 AM   Result Value Ref Range    Sodium 142 136 - 145 mmol/L    Potassium 3 4 (L) 3 5 - 5 3 mmol/L    Chloride 110 (H) 100 - 108 mmol/L    CO2 25 21 - 32 mmol/L    ANION GAP 7 4 - 13 mmol/L    BUN 16 5 - 25 mg/dL    Creatinine 0 82 0 60 - 1 30 mg/dL    Glucose 112 65 - 140 mg/dL    Calcium 9 7 8 3 - 10 1 mg/dL    AST 16 5 - 45 U/L    ALT 18 12 - 78 U/L    Alkaline Phosphatase 94 46 - 116 U/L    Total Protein 7 9 6 4 - 8 2 g/dL    Albumin 3 8 3 5 - 5 0 g/dL    Total Bilirubin 0 85 0 20 - 1 00 mg/dL    eGFR 65 ml/min/1 73sq m   Hemoglobin A1C    Collection Time: 03/18/22  8:09 AM   Result Value Ref Range    Hemoglobin A1C 5 4 Normal 3 8-5 6%; PreDiabetic 5 7-6 4%; Diabetic >=6 5%; Glycemic control for adults with diabetes <7 0% %     mg/dl   Lipid Panel with Direct LDL reflex    Collection Time: 03/18/22  8:09 AM   Result Value Ref Range    Cholesterol 247 (H) See Comment mg/dL    Triglycerides 70 See Comment mg/dL    HDL, Direct 94 >=50 mg/dL    LDL Calculated 139 (H) 0 - 100 mg/dL   TSH, 3rd generation    Collection Time: 03/18/22  8:09 AM   Result Value Ref Range    TSH 3RD GENERATON 2 690 0 358 - 3 740 uIU/mL   Vitamin D 25 hydroxy    Collection Time: 03/18/22  8:09 AM   Result Value Ref Range    Vit D, 25-Hydroxy 99 6 30 0 - 100 0 ng/mL        Physical Exam  Constitutional:       Appearance: Normal appearance  HENT:      Head: Normocephalic  Right Ear: Tympanic membrane, ear canal and external ear normal       Left Ear: Tympanic membrane, ear canal and external ear normal       Nose: Nose normal  No congestion  Mouth/Throat:      Mouth: Mucous membranes are moist       Pharynx: Oropharynx is clear   No oropharyngeal exudate or posterior oropharyngeal erythema  Eyes:      Extraocular Movements: Extraocular movements intact  Conjunctiva/sclera: Conjunctivae normal       Pupils: Pupils are equal, round, and reactive to light  Cardiovascular:      Rate and Rhythm: Normal rate and regular rhythm  Heart sounds: Normal heart sounds  No murmur heard  Pulmonary:      Effort: Pulmonary effort is normal       Breath sounds: Normal breath sounds  No wheezing or rales  Abdominal:      General: Bowel sounds are normal  There is no distension  Palpations: Abdomen is soft  Tenderness: There is no abdominal tenderness  Musculoskeletal:         General: Normal range of motion  Cervical back: Normal range of motion and neck supple  Right lower leg: No edema  Left lower leg: No edema  Lymphadenopathy:      Cervical: No cervical adenopathy  Skin:     General: Skin is warm  Neurological:      General: No focal deficit present  Mental Status: She is alert and oriented to person, place, and time

## 2022-03-24 ENCOUNTER — TELEPHONE (OUTPATIENT)
Dept: INTERNAL MEDICINE CLINIC | Facility: CLINIC | Age: 87
End: 2022-03-24

## 2022-05-23 ENCOUNTER — TELEMEDICINE (OUTPATIENT)
Dept: INTERNAL MEDICINE CLINIC | Facility: CLINIC | Age: 87
End: 2022-05-23
Payer: COMMERCIAL

## 2022-05-23 DIAGNOSIS — J04.10 TRACHEITIS: Primary | ICD-10-CM

## 2022-05-23 PROCEDURE — 99442 PR PHYS/QHP TELEPHONE EVALUATION 11-20 MIN: CPT | Performed by: INTERNAL MEDICINE

## 2022-05-23 RX ORDER — AZITHROMYCIN 500 MG/1
500 TABLET, FILM COATED ORAL DAILY
Qty: 5 TABLET | Refills: 0 | Status: SHIPPED | OUTPATIENT
Start: 2022-05-23 | End: 2022-05-28

## 2022-05-23 NOTE — PROGRESS NOTES
Virtual Brief Visit    Patient is located in the following state in which I hold an active license [place]  Cough with green sputum, chills,   Advised her to call me if not better or getting worse  Assessment/Plan:    Problem List Items Addressed This Visit        Respiratory    Tracheitis - Primary    Relevant Medications    azithromycin (ZITHROMAX) 500 MG tablet          Recent Visits  No visits were found meeting these conditions  Showing recent visits within past 7 days and meeting all other requirements  Today's Visits  Date Type Provider Dept   05/23/22 Telemedicine Joseline Kuhn MD [facility]  [address] today's visits and meeting all other requirements  Future Appointments  No visits were found meeting these conditions    Showing future appointments within next 150 days and meeting all other requirements         I spent 20 minutes directly with the patient during this visit

## 2022-05-31 ENCOUNTER — APPOINTMENT (OUTPATIENT)
Dept: RADIOLOGY | Age: 87
End: 2022-05-31
Payer: COMMERCIAL

## 2022-05-31 ENCOUNTER — TELEPHONE (OUTPATIENT)
Dept: INTERNAL MEDICINE CLINIC | Facility: CLINIC | Age: 87
End: 2022-05-31

## 2022-05-31 DIAGNOSIS — R06.02 SOB (SHORTNESS OF BREATH): ICD-10-CM

## 2022-05-31 DIAGNOSIS — R05.9 COUGH: Primary | ICD-10-CM

## 2022-05-31 DIAGNOSIS — J04.10 TRACHEITIS: Primary | ICD-10-CM

## 2022-05-31 DIAGNOSIS — R05.9 COUGH: ICD-10-CM

## 2022-05-31 PROCEDURE — 71046 X-RAY EXAM CHEST 2 VIEWS: CPT

## 2022-05-31 RX ORDER — AZITHROMYCIN 500 MG/1
500 TABLET, FILM COATED ORAL DAILY
Qty: 3 TABLET | Refills: 0 | Status: SHIPPED | OUTPATIENT
Start: 2022-05-31 | End: 2022-06-03

## 2022-05-31 NOTE — TELEPHONE ENCOUNTER
patient called and wanted you to know how she is doing she finished her anabiotics on Saturday , she is feeling a little bit better , still coughing up green stuff and has a headache

## 2022-06-01 ENCOUNTER — TELEPHONE (OUTPATIENT)
Dept: INTERNAL MEDICINE CLINIC | Facility: CLINIC | Age: 87
End: 2022-06-01

## 2022-06-01 DIAGNOSIS — J04.10 TRACHEITIS: Primary | ICD-10-CM

## 2022-06-01 RX ORDER — PREDNISONE 20 MG/1
40 TABLET ORAL DAILY
Qty: 10 TABLET | Refills: 0 | Status: SHIPPED | OUTPATIENT
Start: 2022-06-01 | End: 2022-06-06

## 2022-06-01 NOTE — TELEPHONE ENCOUNTER
Patient left a VM asking for the results of her chest xray  The results are not in yet, so I called the reading room to have it read  Once you receive and review the results, please call patient        CB# [phone]

## 2022-06-28 ENCOUNTER — RA CDI HCC (OUTPATIENT)
Dept: OTHER | Facility: HOSPITAL | Age: 87
End: 2022-06-28

## 2022-06-28 NOTE — PROGRESS NOTES
[facility] 75  coding opportunities       Chart reviewed, no opportunity found:   [address]        Patients Insurance     Medicare Insurance: [employer] Advantage

## 2022-07-13 ENCOUNTER — FOLLOW UP (OUTPATIENT)
Dept: URBAN - METROPOLITAN AREA CLINIC 6 | Facility: CLINIC | Age: 87
End: 2022-07-13

## 2022-07-13 DIAGNOSIS — Z96.1: ICD-10-CM

## 2022-07-13 DIAGNOSIS — H04.123: ICD-10-CM

## 2022-07-13 PROCEDURE — 92014 COMPRE OPH EXAM EST PT 1/>: CPT

## 2022-07-13 ASSESSMENT — KERATOMETRY
OD_AXISANGLE2_DEGREES: 1
OS_K1POWER_DIOPTERS: 43.25
OS_AXISANGLE_DEGREES: 77
OD_K2POWER_DIOPTERS: 44.50
OD_K1POWER_DIOPTERS: 43.25
OD_AXISANGLE_DEGREES: 91
OS_K2POWER_DIOPTERS: 44.25
OS_AXISANGLE2_DEGREES: 167

## 2022-07-13 ASSESSMENT — TONOMETRY
OS_IOP_MMHG: 12
OD_IOP_MMHG: 14

## 2022-07-13 ASSESSMENT — VISUAL ACUITY
OD_SC: 20/30-1
OU_CC: J1-1
OS_SC: 20/30-2

## 2022-07-26 ENCOUNTER — OFFICE VISIT (OUTPATIENT)
Dept: INTERNAL MEDICINE CLINIC | Facility: CLINIC | Age: 87
End: 2022-07-26
Payer: COMMERCIAL

## 2022-07-26 VITALS
DIASTOLIC BLOOD PRESSURE: 84 MMHG | HEIGHT: 65 IN | BODY MASS INDEX: 23.99 KG/M2 | HEART RATE: 87 BPM | TEMPERATURE: 98.2 F | SYSTOLIC BLOOD PRESSURE: 150 MMHG | WEIGHT: 144 LBS | OXYGEN SATURATION: 97 %

## 2022-07-26 DIAGNOSIS — F51.01 PRIMARY INSOMNIA: ICD-10-CM

## 2022-07-26 DIAGNOSIS — J45.20 MILD INTERMITTENT ASTHMA WITHOUT COMPLICATION: ICD-10-CM

## 2022-07-26 DIAGNOSIS — E78.2 MIXED HYPERLIPIDEMIA: Primary | ICD-10-CM

## 2022-07-26 DIAGNOSIS — Z00.00 MEDICARE ANNUAL WELLNESS VISIT, SUBSEQUENT: ICD-10-CM

## 2022-07-26 DIAGNOSIS — R73.01 IMPAIRED FASTING GLUCOSE: ICD-10-CM

## 2022-07-26 PROBLEM — J41.0 SIMPLE CHRONIC BRONCHITIS (HCC): Status: ACTIVE | Noted: 2022-07-26

## 2022-07-26 PROBLEM — F33.9 DEPRESSION, RECURRENT (HCC): Status: ACTIVE | Noted: 2022-07-26

## 2022-07-26 PROCEDURE — G0439 PPPS, SUBSEQ VISIT: HCPCS | Performed by: INTERNAL MEDICINE

## 2022-07-26 PROCEDURE — 3725F SCREEN DEPRESSION PERFORMED: CPT | Performed by: INTERNAL MEDICINE

## 2022-07-26 PROCEDURE — 3288F FALL RISK ASSESSMENT DOCD: CPT | Performed by: INTERNAL MEDICINE

## 2022-07-26 PROCEDURE — 1170F FXNL STATUS ASSESSED: CPT | Performed by: INTERNAL MEDICINE

## 2022-07-26 PROCEDURE — 99214 OFFICE O/P EST MOD 30 MIN: CPT | Performed by: INTERNAL MEDICINE

## 2022-07-26 PROCEDURE — 1125F AMNT PAIN NOTED PAIN PRSNT: CPT | Performed by: INTERNAL MEDICINE

## 2022-07-26 RX ORDER — UREA 10 %
2 LOTION (ML) TOPICAL
Qty: 60 TABLET | Refills: 2 | Status: SHIPPED | OUTPATIENT
Start: 2022-07-26

## 2022-07-26 NOTE — PROGRESS NOTES
Assessment and Plan:     Problem List Items Addressed This Visit        Endocrine    Impaired fasting glucose    Relevant Orders    CBC and differential    Hemoglobin A1C       Respiratory    Mild intermittent asthma without complication       Other    Mixed hyperlipidemia - Primary    Relevant Orders    Comprehensive metabolic panel    Lipid Panel with Direct LDL reflex    TSH, 3rd generation    Medicare annual wellness visit, subsequent    Primary insomnia    Relevant Medications    melatonin 1 mg          Falls Plan of Care: balance, strength, and gait training instructions were provided  Preventive health issues were discussed with patient, and age appropriate screening tests were ordered as noted in patient's After Visit Summary  Personalized health advice and appropriate referrals for health education or preventive services given if needed, as noted in patient's After Visit Summary  History of Present Illness:     Patient presents for a Medicare Wellness Visit    Follow-up on multiple medical problems to ensure the stable on current medication, also physical medical wellness exam     Patient Care Team:  Adelina Abad MD as PCP - General (Internal Medicine)     Review of Systems:     Review of Systems   Constitutional: Negative for chills and fever  HENT: Negative for congestion, ear pain and sore throat  Eyes: Negative for pain  Respiratory: Negative for cough and shortness of breath  Cardiovascular: Negative for chest pain and leg swelling  Gastrointestinal: Negative for abdominal pain, nausea and vomiting  Endocrine: Negative for polyuria  Genitourinary: Negative for difficulty urinating, frequency and urgency  Musculoskeletal: Positive for arthralgias and back pain  Skin: Negative for rash  Neurological: Negative for weakness and headaches  Psychiatric/Behavioral: Negative for sleep disturbance  The patient is not nervous/anxious           Problem List:     Patient Active Problem List   Diagnosis    Vitamin D deficiency disease    Mixed hyperlipidemia    Impaired fasting glucose    Rotator cuff arthropathy, left    Fall (on) (from) other stairs and steps, initial encounter    Mild intermittent asthma without complication    Martinez neuroma, left    Screening for malignant neoplasm of colon    Colon cancer screening declined    Medicare annual wellness visit, subsequent    Allergic reaction    Cellulitis of left lower extremity    Acute pain of left foot    Neuroma of foot    Acute maxillary sinusitis    Dysthymic disorder    Post-menopausal osteoporosis    Cortical age-related cataract of both eyes    Urticaria    Rash    Dizziness    Tracheitis    Primary insomnia    Simple chronic bronchitis (HCC)    Depression, recurrent ([facility] [address] )      Past Medical and Surgical History:     Past Medical History:   Diagnosis Date    Abnormal weight loss     Allergic rhinitis     NOS    Allergic rhinitis due to allergen     Anxiety disorder     Asthma     NOS    Body mass index (BMI) 22 0-22 9, adult 01/25/2017    Body mass index (BMI) 23 0-23 9, adult 08/16/2017    Body mass index (BMI) 24 0-24 9, adult 11/23/2015    COPD (chronic obstructive pulmonary disease) ([facility] [address] )     COVID-19 06/2022    Crushing injury of multiple sites of trunk     Disorder of rotator cuff, left     Disorder of sacrum     Elevated blood pressure reading without diagnosis of hypertension     Enthesopathy     Hypercholesterolemia     Hyperlipemia     Hyperlipidemia, unspecified     Hypertension     Impaired fasting glucose     Insomnia     Loss of weight     Osteoarthritis     Osteoporosis     Other asthma     Sacrococcygeal disorders, not elsewhere classified     Shingles 11/07/2012    T6-7 dermatome, right    Unspecified osteoarthritis, unspecified site     Vitamin D deficiency      Past Surgical History:   Procedure Laterality Date    COLONOSCOPY  01/23/2009    3 broad-based polyps found in the ascending colon and hepatic flexure; all polyps removed by snare cautery polypectomy  Colonoscopy recommended in 5 years  Dr Jadon Carrasco History:     History reviewed  No pertinent family history  Social History:     Social History     Socioeconomic History    Marital status:      Spouse name: None    Number of children: None    Years of education: None    Highest education level: None   Occupational History    None   Tobacco Use    Smoking status: Former Smoker    Smokeless tobacco: Never Used    [facility] comment: [facility]   Vaping Use    Vaping Use: Never used   Substance and Sexual Activity    Alcohol use: Yes     Comment: Alcohol: Socially - As per [facility]     Drug use: Never    Sexual activity: None   Other Topics Concern    None   Social History Narrative    None     Social Determinants of Health     Financial Resource Strain: Not on file   Food Insecurity: Not on file   Transportation Needs: Not on file   Physical Activity: Not on file   Stress: Not on file   Social Connections: Not on file   Intimate Partner Violence: Not on file   Housing Stability: Not on file      Medications and Allergies:     Current Outpatient Medications   Medication Sig Dispense Refill    acetaminophen (TYLENOL) 650 mg CR tablet Take 1 tablet (650 mg total) by mouth every 8 (eight) hours as needed for mild pain She prefers capsule, please give her capsules   60 tablet 1    b complex vitamins capsule Take 1 capsule by mouth daily      ergocalciferol (VITAMIN D2) 50,000 units take 1 capsule by mouth every week 12 capsule 1    meclizine (ANTIVERT) 25 mg tablet Take 1 tablet (25 mg total) by mouth every 8 (eight) hours as needed for dizziness 30 tablet 0    melatonin 1 mg Take 2 tablets (2 mg total) by mouth daily at bedtime 60 tablet 2    Besivance 0 6 % SUSP       Lotemax SM 0 38 % GEL No current facility-administered medications for this visit  Allergies   Allergen Reactions    Alendronate Hives    Biaxin [Clarithromycin]     Clindamycin     Raloxifene Hives    Risedronate Hives    Iodine - Food Allergy Rash      Immunizations:     Immunization History   Administered Date(s) Administered    COVID-19 PFIZER VACCINE 0 3 ML IM 01/23/2021, 02/12/2021, 10/14/2021    DTaP 09/28/2019    DTaP,unspecified 09/28/2019    H1N1, All Formulations 12/18/2009    INFLUENZA 10/16/2014    Influenza, high dose seasonal 0 7 mL 10/23/2020, 09/29/2021    Pneumococcal 10/20/2006    Pneumococcal Conjugate 13-Valent 10/09/2015    Pneumococcal Conjugate PCV 7 10/20/2006    Tdap 09/28/2018    Zoster 08/04/2008    Zoster Vaccine Recombinant 08/28/2018, 09/28/2018, 01/15/2019, 01/15/2019      Health Maintenance:         Topic Date Due    Colorectal Cancer Screening  01/23/2014         Topic Date Due    Pneumococcal Vaccine: 65+ Years (2 - PPSV23 or PCV20) 10/09/2016    COVID-19 Vaccine (4 - Booster for Andrews Peter series) 02/14/2022    Influenza Vaccine (1) 09/01/2022      Medicare Screening Tests and Risk Assessments:     Darby Churchill is here for her Subsequent Wellness visit  Health Risk Assessment:   Patient rates overall health as very good  Patient feels that their physical health rating is same  Patient is satisfied with their life  Eyesight was rated as same  Hearing was rated as same  Patient feels that their emotional and mental health rating is same  Patients states they are never, rarely angry  Patient states they are sometimes unusually tired/fatigued  Pain experienced in the last 7 days has been some  Patient's pain rating has been 3/10  Patient states that she has experienced weight loss or gain in last 6 months  Fall Risk Screening:    In the past year, patient has experienced: history of falling in past year    Number of falls: 2 or more  Injured during fall?: Yes    Feels unsteady when standing or walking?: No    Worried about falling?: Yes      Urinary Incontinence Screening:   Patient has not leaked urine accidently in the last six months  Home Safety:  Patient has trouble with stairs inside or outside of their home  Patient has working smoke alarms and has working carbon monoxide detector  Home safety hazards include: none  Nutrition:   Current diet is Regular and Limited junk food  Medications:   Patient is not currently taking any over-the-counter supplements  Patient is able to manage medications  Activities of Daily Living (ADLs)/Instrumental Activities of Daily Living (IADLs):   Walk and transfer into and out of bed and chair?: Yes  Dress and groom yourself?: Yes    Bathe or shower yourself?: Yes    Feed yourself? Yes  Do your laundry/housekeeping?: Yes  Manage your money, pay your bills and track your expenses?: Yes  Make your own meals?: Yes    Do your own shopping?: Yes    Previous Hospitalizations:   Any hospitalizations or ED visits within the last 12 months?: No      Advance Care Planning:   Living will: No    Advanced directive: No    Five wishes given: Yes      PREVENTIVE SCREENINGS      Cardiovascular Screening:    General: Screening Not Indicated and History Lipid Disorder      Diabetes Screening:     General: Screening Current      Cervical Cancer Screening:    General: Screening Not Indicated      Osteoporosis Screening:    General: Screening Not Indicated and History Osteoporosis      Lung Cancer Screening:     General: Screening Not Indicated    Screening, Brief Intervention, and Referral to Treatment (SBIRT)    Screening  Typical number of drinks in a day: 0  Typical number of drinks in a week: 0  Interpretation: Low risk drinking behavior      Single Item Drug Screening:  How often have you used an illegal drug (including marijuana) or a prescription medication for non-medical reasons in the past year? never    Single Item Drug Screen Score: 0  Interpretation: Negative screen for possible drug use disorder    No exam data present     Physical Exam:     /84 (BP Location: Left arm, Patient Position: Sitting, Cuff Size: Large)   Pulse 87   Temp 98 2 °F (36 8 °C) (Temporal)   Ht 5' 4 5" (1 638 m)   Wt 65 3 kg (144 lb)   SpO2 97% Comment: RA  BMI 24 34 kg/m²     Physical Exam  Vitals and nursing note reviewed  Constitutional:       General: She is not in acute distress  Appearance: She is well-developed  HENT:      Head: Normocephalic and atraumatic  Right Ear: Tympanic membrane, ear canal and external ear normal       Left Ear: Tympanic membrane, ear canal and external ear normal       Mouth/Throat:      Pharynx: Oropharynx is clear  Eyes:      Extraocular Movements: Extraocular movements intact  Conjunctiva/sclera: Conjunctivae normal    Cardiovascular:      Rate and Rhythm: Normal rate and regular rhythm  Heart sounds: Normal heart sounds  No murmur heard  Pulmonary:      Effort: Pulmonary effort is normal  No respiratory distress  Breath sounds: Normal breath sounds  Abdominal:      General: Abdomen is flat  Palpations: Abdomen is soft  Tenderness: There is no abdominal tenderness  Musculoskeletal:         General: Normal range of motion  Cervical back: Normal range of motion and neck supple  Skin:     General: Skin is warm and dry  Neurological:      General: No focal deficit present  Mental Status: She is alert and oriented to person, place, and time            Eun Holguin MD

## 2022-09-09 ENCOUNTER — VBI (OUTPATIENT)
Dept: ADMINISTRATIVE | Facility: OTHER | Age: 87
End: 2022-09-09

## 2022-10-11 PROBLEM — Z00.00 MEDICARE ANNUAL WELLNESS VISIT, SUBSEQUENT: Status: RESOLVED | Noted: 2021-05-10 | Resolved: 2022-10-11

## 2022-10-12 PROBLEM — Z12.11 SCREENING FOR MALIGNANT NEOPLASM OF COLON: Status: RESOLVED | Noted: 2021-03-24 | Resolved: 2022-10-12

## 2022-10-12 PROBLEM — J01.00 ACUTE MAXILLARY SINUSITIS: Status: RESOLVED | Noted: 2021-08-25 | Resolved: 2022-10-12

## 2022-11-01 ENCOUNTER — APPOINTMENT (OUTPATIENT)
Dept: LAB | Age: 87
End: 2022-11-01

## 2022-11-01 DIAGNOSIS — R73.01 IMPAIRED FASTING GLUCOSE: ICD-10-CM

## 2022-11-01 DIAGNOSIS — E78.2 MIXED HYPERLIPIDEMIA: ICD-10-CM

## 2022-11-01 LAB
ALBUMIN SERPL BCP-MCNC: 3.6 G/DL (ref 3.5–5)
ALP SERPL-CCNC: 80 U/L (ref 46–116)
ALT SERPL W P-5'-P-CCNC: 19 U/L (ref 12–78)
ANION GAP SERPL CALCULATED.3IONS-SCNC: 4 MMOL/L (ref 4–13)
AST SERPL W P-5'-P-CCNC: 14 U/L (ref 5–45)
BASOPHILS # BLD AUTO: 0.06 THOUSANDS/ÂΜL (ref 0–0.1)
BASOPHILS NFR BLD AUTO: 1 % (ref 0–1)
BILIRUB SERPL-MCNC: 0.61 MG/DL (ref 0.2–1)
BUN SERPL-MCNC: 12 MG/DL (ref 5–25)
CALCIUM SERPL-MCNC: 9.4 MG/DL (ref 8.3–10.1)
CHLORIDE SERPL-SCNC: 107 MMOL/L (ref 96–108)
CHOLEST SERPL-MCNC: 259 MG/DL
CO2 SERPL-SCNC: 29 MMOL/L (ref 21–32)
CREAT SERPL-MCNC: 0.88 MG/DL (ref 0.6–1.3)
EOSINOPHIL # BLD AUTO: 0.18 THOUSAND/ÂΜL (ref 0–0.61)
EOSINOPHIL NFR BLD AUTO: 3 % (ref 0–6)
ERYTHROCYTE [DISTWIDTH] IN BLOOD BY AUTOMATED COUNT: 13.8 % (ref 11.6–15.1)
EST. AVERAGE GLUCOSE BLD GHB EST-MCNC: 111 MG/DL
GFR SERPL CREATININE-BSD FRML MDRD: 60 ML/MIN/1.73SQ M
GLUCOSE P FAST SERPL-MCNC: 100 MG/DL (ref 65–99)
HBA1C MFR BLD: 5.5 %
HCT VFR BLD AUTO: 42.3 % (ref 34.8–46.1)
HDLC SERPL-MCNC: 87 MG/DL
HGB BLD-MCNC: 13.4 G/DL (ref 11.5–15.4)
IMM GRANULOCYTES # BLD AUTO: 0.01 THOUSAND/UL (ref 0–0.2)
IMM GRANULOCYTES NFR BLD AUTO: 0 % (ref 0–2)
LDLC SERPL CALC-MCNC: 159 MG/DL (ref 0–100)
LYMPHOCYTES # BLD AUTO: 2.46 THOUSANDS/ÂΜL (ref 0.6–4.47)
LYMPHOCYTES NFR BLD AUTO: 43 % (ref 14–44)
MCH RBC QN AUTO: 28.9 PG (ref 26.8–34.3)
MCHC RBC AUTO-ENTMCNC: 31.7 G/DL (ref 31.4–37.4)
MCV RBC AUTO: 91 FL (ref 82–98)
MONOCYTES # BLD AUTO: 0.47 THOUSAND/ÂΜL (ref 0.17–1.22)
MONOCYTES NFR BLD AUTO: 8 % (ref 4–12)
NEUTROPHILS # BLD AUTO: 2.51 THOUSANDS/ÂΜL (ref 1.85–7.62)
NEUTS SEG NFR BLD AUTO: 45 % (ref 43–75)
NRBC BLD AUTO-RTO: 0 /100 WBCS
PLATELET # BLD AUTO: 332 THOUSANDS/UL (ref 149–390)
PMV BLD AUTO: 10 FL (ref 8.9–12.7)
POTASSIUM SERPL-SCNC: 3.9 MMOL/L (ref 3.5–5.3)
PROT SERPL-MCNC: 7.1 G/DL (ref 6.4–8.4)
RBC # BLD AUTO: 4.63 MILLION/UL (ref 3.81–5.12)
SODIUM SERPL-SCNC: 140 MMOL/L (ref 135–147)
TRIGL SERPL-MCNC: 67 MG/DL
TSH SERPL DL<=0.05 MIU/L-ACNC: 1.66 UIU/ML (ref 0.45–4.5)
WBC # BLD AUTO: 5.69 THOUSAND/UL (ref 4.31–10.16)

## 2022-11-09 ENCOUNTER — OFFICE VISIT (OUTPATIENT)
Dept: INTERNAL MEDICINE CLINIC | Facility: CLINIC | Age: 87
End: 2022-11-09

## 2022-11-09 VITALS
TEMPERATURE: 97.5 F | WEIGHT: 140 LBS | DIASTOLIC BLOOD PRESSURE: 72 MMHG | BODY MASS INDEX: 23.32 KG/M2 | SYSTOLIC BLOOD PRESSURE: 142 MMHG | HEART RATE: 90 BPM | HEIGHT: 65 IN | OXYGEN SATURATION: 97 %

## 2022-11-09 DIAGNOSIS — Z23 ENCOUNTER FOR IMMUNIZATION: ICD-10-CM

## 2022-11-09 DIAGNOSIS — E55.9 VITAMIN D DEFICIENCY DISEASE: ICD-10-CM

## 2022-11-09 DIAGNOSIS — R73.01 IMPAIRED FASTING GLUCOSE: ICD-10-CM

## 2022-11-09 DIAGNOSIS — M12.812 ROTATOR CUFF ARTHROPATHY, LEFT: ICD-10-CM

## 2022-11-09 DIAGNOSIS — Z23 NEEDS FLU SHOT: ICD-10-CM

## 2022-11-09 DIAGNOSIS — E78.2 MIXED HYPERLIPIDEMIA: Primary | ICD-10-CM

## 2022-11-09 DIAGNOSIS — J45.20 MILD INTERMITTENT ASTHMA WITHOUT COMPLICATION: ICD-10-CM

## 2022-11-09 NOTE — PROGRESS NOTES
Assessment/Plan:             1  Mixed hyperlipidemia    2  Mild intermittent asthma without complication    3  Impaired fasting glucose    4  Vitamin D deficiency disease    5  Rotator cuff arthropathy, left    6  Needs flu shot  -     influenza vaccine, high-dose, PF 0 7 mL (FLUZONE HIGH-DOSE)         Subjective:      Patient ID: Ambrosio Jurado is a 82 y o  female  Follow-up on blood test done on 11/01/2022 test discussed with her      The following portions of the patient's history were reviewed and updated as appropriate: She  has a past medical history of Abnormal weight loss, Allergic rhinitis, Allergic rhinitis due to allergen, Anxiety disorder, Asthma, Body mass index (BMI) 22 0-22 9, adult (01/25/2017), Body mass index (BMI) 23 0-23 9, adult (08/16/2017), Body mass index (BMI) 24 0-24 9, adult (11/23/2015), COPD (chronic obstructive pulmonary disease) ([facility] ), COVID-19 (06/2022), Crushing injury of multiple sites of trunk, Disorder of rotator cuff, left, Disorder of sacrum, Elevated blood pressure reading without diagnosis of hypertension, Enthesopathy, Hypercholesterolemia, Hyperlipemia, Hyperlipidemia, unspecified, Hypertension, Impaired fasting glucose, Insomnia, Loss of weight, Osteoarthritis, Osteoporosis, Other asthma, Sacrococcygeal disorders, not elsewhere classified, Shingles (11/07/2012), Unspecified osteoarthritis, unspecified site, and Vitamin D deficiency    She   Patient Active Problem List    Diagnosis Date Noted   • Primary insomnia 07/26/2022   • Simple chronic bronchitis ([facility] ) 07/26/2022   • Depression, recurrent ([facility] ) 07/26/2022   • Tracheitis 05/23/2022   • Dizziness 03/23/2022   • Urticaria 02/28/2022   • Rash 02/28/2022   • Cortical age-related cataract of both eyes 02/03/2022   • Post-menopausal osteoporosis 09/29/2021   • Dysthymic disorder 08/25/2021   • Acute pain of left foot 05/19/2021   • Neuroma of foot 05/19/2021   • Allergic reaction 05/10/2021   • Cellulitis of left lower extremity 05/10/2021   • Martinez neuroma, 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   • Colon cancer screening declined 03/24/2021   • Mild intermittent asthma without complication 20/68/4715   • Rotator cuff arthropathy, left 09/29/2020   • Fall (on) (from) other stairs and steps, initial encounter 09/29/2020   • Vitamin D deficiency disease 09/14/2020   • Mixed hyperlipidemia 09/14/2020   • Impaired fasting glucose 09/14/2020     She  has a past surgical history that includes Sinus surgery and Colonoscopy (01/23/2009)  Her family history is not on file  She  reports that she has quit smoking  She has never used smokeless tobacco  She reports current alcohol use  She reports that she does not use drugs  Current Outpatient Medications   Medication Sig Dispense Refill   • acetaminophen (TYLENOL) 650 mg CR tablet Take 1 tablet (650 mg total) by mouth every 8 (eight) hours as needed for mild pain She prefers capsule, please give her capsules  60 tablet 1   • b complex vitamins capsule Take 1 capsule by mouth daily     • ergocalciferol (VITAMIN D2) 50,000 units take 1 capsule by mouth every week 12 capsule 1   • meclizine (ANTIVERT) 25 mg tablet Take 1 tablet (25 mg total) by mouth every 8 (eight) hours as needed for dizziness 30 tablet 0     No current facility-administered medications for this visit  Current Outpatient Medications on File Prior to Visit   Medication Sig   • acetaminophen (TYLENOL) 650 mg CR tablet Take 1 tablet (650 mg total) by mouth every 8 (eight) hours as needed for mild pain She prefers capsule, please give her capsules     • b complex vitamins capsule Take 1 capsule by mouth daily   • ergocalciferol (VITAMIN D2) 50,000 units take 1 capsule by mouth every week   • meclizine (ANTIVERT) 25 mg tablet Take 1 tablet (25 mg total) by mouth every 8 (eight) hours as needed for dizziness   • [DISCONTINUED] Besivance 0 6 % SUSP    • [DISCONTINUED] Lotemax SM 0 38 % GEL    • [DISCONTINUED] melatonin 1 mg Take 2 tablets (2 mg total) by mouth daily at bedtime     No current facility-administered medications on file prior to visit  She is allergic to alendronate, biaxin [clarithromycin], clindamycin, raloxifene, risedronate, and iodine - food allergy       Review of Systems   Constitutional: Negative for chills and fever  HENT: Negative for congestion, ear pain and sore throat  Eyes: Negative for pain  Respiratory: Negative for cough and shortness of breath  Cardiovascular: Negative for chest pain and leg swelling  Gastrointestinal: Negative for abdominal pain, nausea and vomiting  Endocrine: Negative for polyuria  Genitourinary: Negative for difficulty urinating, frequency and urgency  Musculoskeletal: Positive for arthralgias and back pain  Skin: Negative for rash  Neurological: Negative for weakness and headaches  Psychiatric/Behavioral: Negative for sleep disturbance  The patient is not nervous/anxious            Objective:      /72 (BP Location: Left arm, Patient Position: Sitting, Cuff Size: Adult)   Pulse 90   Temp 97 5 °F (36 4 °C) (Temporal)   Ht 5' 4 5" (1 638 m)   Wt 63 5 kg (140 lb)   SpO2 97%   BMI 23 66 kg/m²     Recent Results (from the past 1344 hour(s))   CBC and differential    Collection Time: 11/01/22  7:53 AM   Result Value Ref Range    WBC 5 69 4 31 - 10 16 Thousand/uL    RBC 4 63 3 81 - 5 12 Million/uL    Hemoglobin 13 4 11 5 - 15 4 g/dL    Hematocrit 42 3 34 8 - 46 1 %    MCV 91 82 - 98 fL    MCH 28 9 26 8 - 34 3 pg    MCHC 31 7 31 4 - 37 4 g/dL    RDW 13 8 11 6 - 15 1 %    MPV 10 0 8 9 - 12 7 fL    Platelets 083 588 - 038 Thousands/uL    nRBC 0 /100 WBCs    Neutrophils Relative 45 43 - 75 %    Immat GRANS % 0 0 - 2 %    Lymphocytes Relative 43 14 - 44 %    Monocytes Relative 8 4 - 12 %    Eosinophils Relative 3 0 - 6 %    Basophils Relative 1 0 - 1 %    Neutrophils Absolute 2 51 1 85 - 7 62 Thousands/µL    Immature Grans Absolute 0 01 0 00 - 0 20 Thousand/uL    Lymphocytes Absolute 2 46 0 60 - 4 47 Thousands/µL    Monocytes Absolute 0 47 0 17 - 1 22 Thousand/µL    Eosinophils Absolute 0 18 0 00 - 0 61 Thousand/µL    Basophils Absolute 0 06 0 00 - 0 10 Thousands/µL   Comprehensive metabolic panel    Collection Time: 11/01/22  7:53 AM   Result Value Ref Range    Sodium 140 135 - 147 mmol/L    Potassium 3 9 3 5 - 5 3 mmol/L    Chloride 107 96 - 108 mmol/L    CO2 29 21 - 32 mmol/L    ANION GAP 4 4 - 13 mmol/L    BUN 12 5 - 25 mg/dL    Creatinine 0 88 0 60 - 1 30 mg/dL    Glucose, Fasting 100 (H) 65 - 99 mg/dL    Calcium 9 4 8 3 - 10 1 mg/dL    AST 14 5 - 45 U/L    ALT 19 12 - 78 U/L    Alkaline Phosphatase 80 46 - 116 U/L    Total Protein 7 1 6 4 - 8 4 g/dL    Albumin 3 6 3 5 - 5 0 g/dL    Total Bilirubin 0 61 0 20 - 1 00 mg/dL    eGFR 60 ml/min/1 73sq m   Hemoglobin A1C    Collection Time: 11/01/22  7:53 AM   Result Value Ref Range    Hemoglobin A1C 5 5 Normal 3 8-5 6%; PreDiabetic 5 7-6 4%; Diabetic >=6 5%; Glycemic control for adults with diabetes <7 0% %     mg/dl   Lipid Panel with Direct LDL reflex    Collection Time: 11/01/22  7:53 AM   Result Value Ref Range    Cholesterol 259 (H) See Comment mg/dL    Triglycerides 67 See Comment mg/dL    HDL, Direct 87 >=50 mg/dL    LDL Calculated 159 (H) 0 - 100 mg/dL   TSH, 3rd generation    Collection Time: 11/01/22  7:53 AM   Result Value Ref Range    TSH 3RD GENERATON 1 660 0 450 - 4 500 uIU/mL        Physical Exam  Constitutional:       Appearance: Normal appearance  HENT:      Head: Normocephalic  Right Ear: Tympanic membrane, ear canal and external ear normal       Left Ear: Tympanic membrane, ear canal and external ear normal       Nose: Nose normal  No congestion  Mouth/Throat:      Mouth: Mucous membranes are moist       Pharynx: Oropharynx is clear  No oropharyngeal exudate or posterior oropharyngeal erythema  Eyes:      Extraocular Movements: Extraocular movements intact        Conjunctiva/sclera: Conjunctivae normal       Pupils: Pupils are equal, round, and reactive to light  Cardiovascular:      Rate and Rhythm: Normal rate and regular rhythm  Heart sounds: Normal heart sounds  No murmur heard  Pulmonary:      Effort: Pulmonary effort is normal       Breath sounds: Normal breath sounds  No wheezing or rales  Abdominal:      General: Bowel sounds are normal  There is no distension  Palpations: Abdomen is soft  Tenderness: There is no abdominal tenderness  Musculoskeletal:         General: Normal range of motion  Cervical back: Normal range of motion and neck supple  Right lower leg: No edema  Left lower leg: No edema  Lymphadenopathy:      Cervical: No cervical adenopathy  Skin:     General: Skin is warm  Neurological:      General: No focal deficit present  Mental Status: She is alert and oriented to person, place, and time

## 2022-11-16 DIAGNOSIS — E55.9 VITAMIN D DEFICIENCY DISEASE: ICD-10-CM

## 2022-11-16 RX ORDER — ERGOCALCIFEROL 1.25 MG/1
50000 CAPSULE ORAL WEEKLY
Qty: 12 CAPSULE | Refills: 1 | Status: SHIPPED | OUTPATIENT
Start: 2022-11-16

## 2023-02-24 ENCOUNTER — OFFICE VISIT (OUTPATIENT)
Dept: INTERNAL MEDICINE CLINIC | Facility: CLINIC | Age: 88
End: 2023-02-24

## 2023-02-24 VITALS
BODY MASS INDEX: 23.32 KG/M2 | DIASTOLIC BLOOD PRESSURE: 78 MMHG | SYSTOLIC BLOOD PRESSURE: 142 MMHG | OXYGEN SATURATION: 95 % | WEIGHT: 140 LBS | HEIGHT: 65 IN | HEART RATE: 92 BPM | TEMPERATURE: 97.9 F

## 2023-02-24 DIAGNOSIS — Z12.11 SCREENING FOR COLON CANCER: ICD-10-CM

## 2023-02-24 DIAGNOSIS — J41.0 SIMPLE CHRONIC BRONCHITIS (HCC): ICD-10-CM

## 2023-02-24 DIAGNOSIS — J04.10 TRACHEITIS: Primary | ICD-10-CM

## 2023-02-24 DIAGNOSIS — M12.812 ROTATOR CUFF ARTHROPATHY, LEFT: ICD-10-CM

## 2023-02-24 RX ORDER — AZITHROMYCIN 250 MG/1
TABLET, FILM COATED ORAL
Qty: 6 TABLET | Refills: 1 | Status: SHIPPED | OUTPATIENT
Start: 2023-02-24 | End: 2023-02-27

## 2023-02-24 NOTE — PROGRESS NOTES
Assessment/Plan:      Falls Plan of Care: balance, strength, and gait training instructions were provided  1  Tracheitis  -     azithromycin (Zithromax) 250 mg tablet; Take 2 tablets (500 mg total) by mouth daily for 1 day, THEN 1 tablet (250 mg total) daily for 4 days  2  Screening for colon cancer  -     Ambulatory referral for colonoscopy; Future    3  Rotator cuff arthropathy, left  -     Diclofenac Sodium (VOLTAREN) 1 %; Apply 2 g topically 4 (four) times a day    4  Simple chronic bronchitis (HCC)         Subjective:      Patient ID: Bhavna Burnett is a 82 y o  female  Cough with yellow sputum, sob, weak,left shoulder pain      The following portions of the patient's history were reviewed and updated as appropriate: She  has a past medical history of Abnormal weight loss, Allergic rhinitis, Allergic rhinitis due to allergen, Anxiety disorder, Asthma, Body mass index (BMI) 22 0-22 9, adult (01/25/2017), Body mass index (BMI) 23 0-23 9, adult (08/16/2017), Body mass index (BMI) 24 0-24 9, adult (11/23/2015), COPD (chronic obstructive pulmonary disease) ([facility] ), COVID-19 (06/2022), Crushing injury of multiple sites of trunk, Disorder of rotator cuff, left, Disorder of sacrum, Elevated blood pressure reading without diagnosis of hypertension, Enthesopathy, Hypercholesterolemia, Hyperlipemia, Hyperlipidemia, unspecified, Hypertension, Impaired fasting glucose, Insomnia, Loss of weight, Osteoarthritis, Osteoporosis, Other asthma, Sacrococcygeal disorders, not elsewhere classified, Shingles (11/07/2012), Unspecified osteoarthritis, unspecified site, and Vitamin D deficiency    She   Patient Active Problem List    Diagnosis Date Noted   • Screening for colon cancer 02/24/2023   • Primary insomnia 07/26/2022   • Simple chronic bronchitis ([facility] ) 07/26/2022   • Depression, recurrent ([facility] ) 07/26/2022   • Tracheitis 05/23/2022   • Dizziness 03/23/2022   • Urticaria 02/28/2022   • Rash 02/28/2022   • Cortical age-related cataract of both eyes 02/03/2022   • Post-menopausal osteoporosis 09/29/2021   • Dysthymic disorder 08/25/2021   • Acute pain of left foot 05/19/2021   • Neuroma of foot 05/19/2021   • Allergic reaction 05/10/2021   • Cellulitis of left lower extremity 05/10/2021   • Martinez neuroma, 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   • Colon cancer screening declined 03/24/2021   • Mild intermittent asthma without complication 13/34/3240   • Rotator cuff arthropathy, left 09/29/2020   • Fall (on) (from) other stairs and steps, initial encounter 09/29/2020   • Vitamin D deficiency disease 09/14/2020   • Mixed hyperlipidemia 09/14/2020   • Impaired fasting glucose 09/14/2020     She  has a past surgical history that includes Sinus surgery and Colonoscopy (01/23/2009)  Her family history is not on file  She  reports that she has quit smoking  She has never used smokeless tobacco  She reports current alcohol use  She reports that she does not use drugs  Current Outpatient Medications   Medication Sig Dispense Refill   • acetaminophen (TYLENOL) 650 mg CR tablet Take 1 tablet (650 mg total) by mouth every 8 (eight) hours as needed for mild pain She prefers capsule, please give her capsules  60 tablet 1   • azithromycin (Zithromax) 250 mg tablet Take 2 tablets (500 mg total) by mouth daily for 1 day, THEN 1 tablet (250 mg total) daily for 4 days  6 tablet 1   • b complex vitamins capsule Take 1 capsule by mouth daily     • Diclofenac Sodium (VOLTAREN) 1 % Apply 2 g topically 4 (four) times a day 150 g 0   • ergocalciferol (VITAMIN D2) 50,000 units Take 1 capsule (50,000 Units total) by mouth once a week 12 capsule 1   • meclizine (ANTIVERT) 25 mg tablet Take 1 tablet (25 mg total) by mouth every 8 (eight) hours as needed for dizziness 30 tablet 0     No current facility-administered medications for this visit       Current Outpatient Medications on File Prior to Visit   Medication Sig   • acetaminophen (TYLENOL) 650 mg CR tablet Take 1 tablet (650 mg total) by mouth every 8 (eight) hours as needed for mild pain She prefers capsule, please give her capsules  • b complex vitamins capsule Take 1 capsule by mouth daily   • ergocalciferol (VITAMIN D2) 50,000 units Take 1 capsule (50,000 Units total) by mouth once a week   • meclizine (ANTIVERT) 25 mg tablet Take 1 tablet (25 mg total) by mouth every 8 (eight) hours as needed for dizziness     No current facility-administered medications on file prior to visit  She is allergic to alendronate, biaxin [clarithromycin], clindamycin, raloxifene, risedronate, and iodine - food allergy       Review of Systems   Constitutional: Negative for chills and fever  HENT: Negative for congestion, ear pain and sore throat  Eyes: Negative for pain  Respiratory: Positive for cough and shortness of breath  Cardiovascular: Negative for chest pain and leg swelling  Gastrointestinal: Negative for abdominal pain, nausea and vomiting  Endocrine: Negative for polyuria  Genitourinary: Negative for difficulty urinating, frequency and urgency  Musculoskeletal: Positive for arthralgias  Negative for back pain  Skin: Negative for rash  Neurological: Negative for weakness and headaches  Psychiatric/Behavioral: Negative for sleep disturbance  The patient is not nervous/anxious  Objective:      /78 (BP Location: Left arm, Patient Position: Sitting, Cuff Size: Standard)   Pulse 92   Temp 97 9 °F (36 6 °C) (Temporal)   Ht 5' 4 5" (1 638 m)   Wt 63 5 kg (140 lb)   SpO2 95%   BMI 23 66 kg/m²     No results found for this or any previous visit (from the past 1344 hour(s))  Physical Exam  Constitutional:       Appearance: Normal appearance  HENT:      Head: Normocephalic  Right Ear: Tympanic membrane, ear canal and external ear normal       Left Ear: Tympanic membrane, ear canal and external ear normal       Nose: Nose normal  No congestion        Mouth/Throat:      Mouth: Mucous membranes are moist       Pharynx: Oropharynx is clear  No oropharyngeal exudate or posterior oropharyngeal erythema  Eyes:      Extraocular Movements: Extraocular movements intact  Conjunctiva/sclera: Conjunctivae normal       Pupils: Pupils are equal, round, and reactive to light  Cardiovascular:      Rate and Rhythm: Normal rate and regular rhythm  Heart sounds: Normal heart sounds  No murmur heard  Pulmonary:      Effort: Pulmonary effort is normal       Breath sounds: Wheezing present  No rales  Abdominal:      General: Bowel sounds are normal  There is no distension  Palpations: Abdomen is soft  Tenderness: There is no abdominal tenderness  Musculoskeletal:      Cervical back: Normal range of motion and neck supple  Right lower leg: No edema  Left lower leg: No edema  Comments: Left shoulder- movement restricted and painful   Lymphadenopathy:      Cervical: No cervical adenopathy  Skin:     General: Skin is warm  Neurological:      General: No focal deficit present  Mental Status: She is alert and oriented to person, place, and time

## 2023-02-27 ENCOUNTER — TELEPHONE (OUTPATIENT)
Dept: INTERNAL MEDICINE CLINIC | Facility: CLINIC | Age: 88
End: 2023-02-27

## 2023-02-27 DIAGNOSIS — J16.8 PNEUMONIA DUE TO OTHER SPECIFIED INFECTIOUS ORGANISMS: Primary | ICD-10-CM

## 2023-02-27 RX ORDER — AZITHROMYCIN 500 MG/1
500 TABLET, FILM COATED ORAL DAILY
Qty: 5 TABLET | Refills: 1 | Status: SHIPPED | OUTPATIENT
Start: 2023-02-27 | End: 2023-03-04

## 2023-02-27 NOTE — TELEPHONE ENCOUNTER
Patient states that she was here on Friday and was told to call back on Monday to give Dr Lin Eagle an update on how she is feeling  She said that she is not feeling any better  Now has a cough, still having trouble breathing and still has a headache       She would like a call back from Dr Lin Eagle

## 2023-02-27 NOTE — TELEPHONE ENCOUNTER
mirian called and stated SHE DOES NOT HAVE A RIDE TO THE OFFICE and would like a call she is still not feeling any better   Did offer her an appointment but stated she is not well enough to come out and would just like a call , she does not have a ride to the office    Phone # [phone] she stated she was not suppose to come into office but just to call to tell you how she was feeling , again offered her an appointment for today

## 2023-04-25 PROBLEM — Z12.11 SCREENING FOR COLON CANCER: Status: RESOLVED | Noted: 2023-02-24 | Resolved: 2023-04-25

## 2023-04-27 ENCOUNTER — TELEPHONE (OUTPATIENT)
Dept: INTERNAL MEDICINE CLINIC | Facility: CLINIC | Age: 88
End: 2023-04-27

## 2023-04-27 DIAGNOSIS — E78.2 MIXED HYPERLIPIDEMIA: Primary | ICD-10-CM

## 2023-04-27 DIAGNOSIS — R73.01 IMPAIRED FASTING GLUCOSE: ICD-10-CM

## 2023-05-04 ENCOUNTER — APPOINTMENT (OUTPATIENT)
Dept: LAB | Age: 88
End: 2023-05-04

## 2023-05-04 DIAGNOSIS — R73.01 IMPAIRED FASTING GLUCOSE: ICD-10-CM

## 2023-05-04 DIAGNOSIS — E78.2 MIXED HYPERLIPIDEMIA: ICD-10-CM

## 2023-05-04 LAB
ALBUMIN SERPL BCP-MCNC: 3.7 G/DL (ref 3.5–5)
ALP SERPL-CCNC: 80 U/L (ref 46–116)
ALT SERPL W P-5'-P-CCNC: 18 U/L (ref 12–78)
ANION GAP SERPL CALCULATED.3IONS-SCNC: 2 MMOL/L (ref 4–13)
AST SERPL W P-5'-P-CCNC: 16 U/L (ref 5–45)
BASOPHILS # BLD AUTO: 0.04 THOUSANDS/ÂΜL (ref 0–0.1)
BASOPHILS NFR BLD AUTO: 1 % (ref 0–1)
BILIRUB SERPL-MCNC: 0.71 MG/DL (ref 0.2–1)
BUN SERPL-MCNC: 12 MG/DL (ref 5–25)
CALCIUM SERPL-MCNC: 9.5 MG/DL (ref 8.3–10.1)
CHLORIDE SERPL-SCNC: 110 MMOL/L (ref 96–108)
CHOLEST SERPL-MCNC: 254 MG/DL
CO2 SERPL-SCNC: 28 MMOL/L (ref 21–32)
CREAT SERPL-MCNC: 0.73 MG/DL (ref 0.6–1.3)
EOSINOPHIL # BLD AUTO: 0.2 THOUSAND/ÂΜL (ref 0–0.61)
EOSINOPHIL NFR BLD AUTO: 4 % (ref 0–6)
ERYTHROCYTE [DISTWIDTH] IN BLOOD BY AUTOMATED COUNT: 13.8 % (ref 11.6–15.1)
GFR SERPL CREATININE-BSD FRML MDRD: 75 ML/MIN/1.73SQ M
GLUCOSE P FAST SERPL-MCNC: 99 MG/DL (ref 65–99)
HCT VFR BLD AUTO: 42.9 % (ref 34.8–46.1)
HDLC SERPL-MCNC: 89 MG/DL
HGB BLD-MCNC: 14.1 G/DL (ref 11.5–15.4)
IMM GRANULOCYTES # BLD AUTO: 0.01 THOUSAND/UL (ref 0–0.2)
IMM GRANULOCYTES NFR BLD AUTO: 0 % (ref 0–2)
LDLC SERPL CALC-MCNC: 145 MG/DL (ref 0–100)
LYMPHOCYTES # BLD AUTO: 2.34 THOUSANDS/ÂΜL (ref 0.6–4.47)
LYMPHOCYTES NFR BLD AUTO: 41 % (ref 14–44)
MCH RBC QN AUTO: 29.8 PG (ref 26.8–34.3)
MCHC RBC AUTO-ENTMCNC: 32.9 G/DL (ref 31.4–37.4)
MCV RBC AUTO: 91 FL (ref 82–98)
MONOCYTES # BLD AUTO: 0.48 THOUSAND/ÂΜL (ref 0.17–1.22)
MONOCYTES NFR BLD AUTO: 8 % (ref 4–12)
NEUTROPHILS # BLD AUTO: 2.63 THOUSANDS/ÂΜL (ref 1.85–7.62)
NEUTS SEG NFR BLD AUTO: 46 % (ref 43–75)
NRBC BLD AUTO-RTO: 0 /100 WBCS
PLATELET # BLD AUTO: 389 THOUSANDS/UL (ref 149–390)
PMV BLD AUTO: 9.9 FL (ref 8.9–12.7)
POTASSIUM SERPL-SCNC: 3.7 MMOL/L (ref 3.5–5.3)
PROT SERPL-MCNC: 7.3 G/DL (ref 6.4–8.4)
RBC # BLD AUTO: 4.73 MILLION/UL (ref 3.81–5.12)
SODIUM SERPL-SCNC: 140 MMOL/L (ref 135–147)
TRIGL SERPL-MCNC: 100 MG/DL
TSH SERPL DL<=0.05 MIU/L-ACNC: 2.47 UIU/ML (ref 0.45–4.5)
WBC # BLD AUTO: 5.7 THOUSAND/UL (ref 4.31–10.16)

## 2023-05-05 LAB
EST. AVERAGE GLUCOSE BLD GHB EST-MCNC: 103 MG/DL
HBA1C MFR BLD: 5.2 %

## 2023-05-10 ENCOUNTER — OFFICE VISIT (OUTPATIENT)
Dept: INTERNAL MEDICINE CLINIC | Facility: CLINIC | Age: 88
End: 2023-05-10

## 2023-05-10 VITALS
WEIGHT: 145 LBS | HEART RATE: 86 BPM | TEMPERATURE: 97.5 F | BODY MASS INDEX: 24.75 KG/M2 | SYSTOLIC BLOOD PRESSURE: 134 MMHG | HEIGHT: 64 IN | OXYGEN SATURATION: 97 % | DIASTOLIC BLOOD PRESSURE: 82 MMHG

## 2023-05-10 DIAGNOSIS — E78.2 MIXED HYPERLIPIDEMIA: Primary | ICD-10-CM

## 2023-05-10 DIAGNOSIS — M12.812 ROTATOR CUFF ARTHROPATHY, LEFT: ICD-10-CM

## 2023-05-10 DIAGNOSIS — E55.9 VITAMIN D DEFICIENCY DISEASE: ICD-10-CM

## 2023-05-10 DIAGNOSIS — M19.071 PRIMARY OSTEOARTHRITIS OF BOTH ANKLES: ICD-10-CM

## 2023-05-10 DIAGNOSIS — R73.01 IMPAIRED FASTING GLUCOSE: ICD-10-CM

## 2023-05-10 DIAGNOSIS — M19.072 PRIMARY OSTEOARTHRITIS OF BOTH ANKLES: ICD-10-CM

## 2023-05-10 RX ORDER — ERGOCALCIFEROL 1.25 MG/1
50000 CAPSULE ORAL WEEKLY
Qty: 13 CAPSULE | Refills: 1 | Status: SHIPPED | OUTPATIENT
Start: 2023-05-10

## 2023-05-10 NOTE — PROGRESS NOTES
Assessment/Plan:             1  Mixed hyperlipidemia    2  Impaired fasting glucose    3  Vitamin D deficiency disease  -     ergocalciferol (VITAMIN D2) 50,000 units; Take 1 capsule (50,000 Units total) by mouth once a week    4  Primary osteoarthritis of both ankles    5  Rotator cuff arthropathy, left           Subjective:      Patient ID: Susan Ring is a 82 y o  female  Follow-up on blood test done on 5/4/2023 test discussed with her    Hyperlipidemia  Pertinent negatives include no chest pain or shortness of breath  The following portions of the patient's history were reviewed and updated as appropriate: She  has a past medical history of Abnormal weight loss, Allergic rhinitis, Allergic rhinitis due to allergen, Anxiety disorder, Asthma, Body mass index (BMI) 22 0-22 9, adult (01/25/2017), Body mass index (BMI) 23 0-23 9, adult (08/16/2017), Body mass index (BMI) 24 0-24 9, adult (11/23/2015), COPD (chronic obstructive pulmonary disease) ([facility] ), COVID-19 (06/2022), Crushing injury of multiple sites of trunk, Disorder of rotator cuff, left, Disorder of sacrum, Elevated blood pressure reading without diagnosis of hypertension, Enthesopathy, Hypercholesterolemia, Hyperlipemia, Hyperlipidemia, unspecified, Hypertension, Impaired fasting glucose, Insomnia, Loss of weight, Osteoarthritis, Osteoporosis, Other asthma, Sacrococcygeal disorders, not elsewhere classified, Shingles (11/07/2012), Unspecified osteoarthritis, unspecified site, and Vitamin D deficiency    She   Patient Active Problem List    Diagnosis Date Noted   • Primary osteoarthritis of both ankles 05/10/2023   • Primary insomnia 07/26/2022   • Simple chronic bronchitis ([facility] ) 07/26/2022   • Depression, recurrent ([facility] ) 07/26/2022   • Tracheitis 05/23/2022   • Dizziness 03/23/2022   • Urticaria 02/28/2022   • Rash 02/28/2022   • Cortical age-related cataract of both eyes 02/03/2022   • Post-menopausal osteoporosis 09/29/2021   • Dysthymic disorder 08/25/2021   • Acute pain of left foot 05/19/2021   • Neuroma of foot 05/19/2021   • Allergic reaction 05/10/2021   • Cellulitis of left lower extremity 05/10/2021   • Martinez neuroma, 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   • Colon cancer screening declined 03/24/2021   • Mild intermittent asthma without complication 95/03/2067   • Rotator cuff arthropathy, left 09/29/2020   • Fall (on) (from) other stairs and steps, initial encounter 09/29/2020   • Vitamin D deficiency disease 09/14/2020   • Mixed hyperlipidemia 09/14/2020   • Impaired fasting glucose 09/14/2020     She  has a past surgical history that includes Sinus surgery and Colonoscopy (01/23/2009)  Her family history is not on file  She  reports that she quit smoking about 20 years ago  Her smoking use included cigarettes  She started smoking about 71 years ago  She smoked an average of  25 packs per day  She has never used smokeless tobacco  She reports current alcohol use  She reports that she does not use drugs  Current Outpatient Medications   Medication Sig Dispense Refill   • b complex vitamins capsule Take 1 capsule by mouth daily     • Diclofenac Sodium (VOLTAREN) 1 % Apply 2 g topically 4 (four) times a day 150 g 0   • ergocalciferol (VITAMIN D2) 50,000 units Take 1 capsule (50,000 Units total) by mouth once a week 13 capsule 1   • meclizine (ANTIVERT) 25 mg tablet Take 1 tablet (25 mg total) by mouth every 8 (eight) hours as needed for dizziness 30 tablet 0   • acetaminophen (TYLENOL) 650 mg CR tablet Take 1 tablet (650 mg total) by mouth every 8 (eight) hours as needed for mild pain She prefers capsule, please give her capsules  (Patient not taking: Reported on 5/10/2023) 60 tablet 1     No current facility-administered medications for this visit       Current Outpatient Medications on File Prior to Visit   Medication Sig   • b complex vitamins capsule Take 1 capsule by mouth daily   • Diclofenac Sodium (VOLTAREN) 1 % Apply 2 g topically 4 (four) times a day   • "meclizine (ANTIVERT) 25 mg tablet Take 1 tablet (25 mg total) by mouth every 8 (eight) hours as needed for dizziness   • [DISCONTINUED] ergocalciferol (VITAMIN D2) 50,000 units Take 1 capsule (50,000 Units total) by mouth once a week   • acetaminophen (TYLENOL) 650 mg CR tablet Take 1 tablet (650 mg total) by mouth every 8 (eight) hours as needed for mild pain She prefers capsule, please give her capsules  (Patient not taking: Reported on 5/10/2023)     No current facility-administered medications on file prior to visit  She is allergic to alendronate, biaxin [clarithromycin], clindamycin, raloxifene, risedronate, and iodine - food allergy       Review of Systems   Constitutional: Negative for chills and fever  HENT: Negative for congestion, ear pain and sore throat  Eyes: Negative for pain  Respiratory: Negative for cough and shortness of breath  Cardiovascular: Negative for chest pain and leg swelling  Gastrointestinal: Negative for abdominal pain, nausea and vomiting  Endocrine: Negative for polyuria  Genitourinary: Negative for difficulty urinating, frequency and urgency  Musculoskeletal: Positive for arthralgias and back pain  Skin: Negative for rash  Neurological: Negative for weakness and headaches  Psychiatric/Behavioral: Negative for sleep disturbance  The patient is not nervous/anxious            Objective:      /82 (BP Location: Left arm, Patient Position: Sitting, Cuff Size: Adult)   Pulse 86   Temp 97 5 °F (36 4 °C) (Temporal)   Ht 5' 4\" (1 626 m)   Wt 65 8 kg (145 lb)   SpO2 97%   BMI 24 89 kg/m²     Recent Results (from the past 1344 hour(s))   CBC and differential    Collection Time: 05/04/23  8:09 AM   Result Value Ref Range    WBC 5 70 4 31 - 10 16 Thousand/uL    RBC 4 73 3 81 - 5 12 Million/uL    Hemoglobin 14 1 11 5 - 15 4 g/dL    Hematocrit 42 9 34 8 - 46 1 %    MCV 91 82 - 98 fL    MCH 29 8 26 8 - 34 3 pg    MCHC 32 9 31 4 - 37 4 g/dL    RDW 13 8 11 6 - " 15 1 %    MPV 9 9 8 9 - 12 7 fL    Platelets 683 249 - 784 Thousands/uL    nRBC 0 /100 WBCs    Neutrophils Relative 46 43 - 75 %    Immat GRANS % 0 0 - 2 %    Lymphocytes Relative 41 14 - 44 %    Monocytes Relative 8 4 - 12 %    Eosinophils Relative 4 0 - 6 %    Basophils Relative 1 0 - 1 %    Neutrophils Absolute 2 63 1 85 - 7 62 Thousands/µL    Immature Grans Absolute 0 01 0 00 - 0 20 Thousand/uL    Lymphocytes Absolute 2 34 0 60 - 4 47 Thousands/µL    Monocytes Absolute 0 48 0 17 - 1 22 Thousand/µL    Eosinophils Absolute 0 20 0 00 - 0 61 Thousand/µL    Basophils Absolute 0 04 0 00 - 0 10 Thousands/µL   Comprehensive metabolic panel    Collection Time: 05/04/23  8:09 AM   Result Value Ref Range    Sodium 140 135 - 147 mmol/L    Potassium 3 7 3 5 - 5 3 mmol/L    Chloride 110 (H) 96 - 108 mmol/L    CO2 28 21 - 32 mmol/L    ANION GAP 2 (L) 4 - 13 mmol/L    BUN 12 5 - 25 mg/dL    Creatinine 0 73 0 60 - 1 30 mg/dL    Glucose, Fasting 99 65 - 99 mg/dL    Calcium 9 5 8 3 - 10 1 mg/dL    AST 16 5 - 45 U/L    ALT 18 12 - 78 U/L    Alkaline Phosphatase 80 46 - 116 U/L    Total Protein 7 3 6 4 - 8 4 g/dL    Albumin 3 7 3 5 - 5 0 g/dL    Total Bilirubin 0 71 0 20 - 1 00 mg/dL    eGFR 75 ml/min/1 73sq m   Hemoglobin A1C    Collection Time: 05/04/23  8:09 AM   Result Value Ref Range    Hemoglobin A1C 5 2 Normal 3 8-5 6%; PreDiabetic 5 7-6 4%; Diabetic >=6 5%; Glycemic control for adults with diabetes <7 0% %     mg/dl   Lipid Panel with Direct LDL reflex    Collection Time: 05/04/23  8:09 AM   Result Value Ref Range    Cholesterol 254 (H) See Comment mg/dL    Triglycerides 100 See Comment mg/dL    HDL, Direct 89 >=50 mg/dL    LDL Calculated 145 (H) 0 - 100 mg/dL   TSH, 3rd generation    Collection Time: 05/04/23  8:09 AM   Result Value Ref Range    TSH 3RD GENERATON 2 470 0 450 - 4 500 uIU/mL        Physical Exam  Constitutional:       Appearance: Normal appearance  HENT:      Head: Normocephalic        Right Ear: Tympanic membrane, ear canal and external ear normal       Left Ear: Tympanic membrane, ear canal and external ear normal       Nose: Nose normal  No congestion  Mouth/Throat:      Mouth: Mucous membranes are moist       Pharynx: Oropharynx is clear  No oropharyngeal exudate or posterior oropharyngeal erythema  Eyes:      Extraocular Movements: Extraocular movements intact  Conjunctiva/sclera: Conjunctivae normal       Pupils: Pupils are equal, round, and reactive to light  Cardiovascular:      Rate and Rhythm: Normal rate and regular rhythm  Heart sounds: Normal heart sounds  No murmur heard  Pulmonary:      Effort: Pulmonary effort is normal       Breath sounds: Normal breath sounds  No wheezing or rales  Abdominal:      General: Bowel sounds are normal  There is no distension  Palpations: Abdomen is soft  Tenderness: There is no abdominal tenderness  Musculoskeletal:         General: Normal range of motion  Cervical back: Normal range of motion and neck supple  Right lower leg: No edema  Left lower leg: No edema  Lymphadenopathy:      Cervical: No cervical adenopathy  Skin:     General: Skin is warm  Neurological:      General: No focal deficit present  Mental Status: She is alert and oriented to person, place, and time

## 2023-10-10 ENCOUNTER — TELEPHONE (OUTPATIENT)
Dept: INTERNAL MEDICINE CLINIC | Facility: CLINIC | Age: 88
End: 2023-10-10

## 2023-10-10 DIAGNOSIS — R73.01 IMPAIRED FASTING GLUCOSE: ICD-10-CM

## 2023-10-10 DIAGNOSIS — E78.2 MIXED HYPERLIPIDEMIA: Primary | ICD-10-CM

## 2023-10-10 NOTE — TELEPHONE ENCOUNTER
Patient called and stated she needs lab work put in before her next j carlos on 11/14/2023 she uses a [facility] facility .

## 2023-10-30 ENCOUNTER — OFFICE VISIT (OUTPATIENT)
Dept: INTERNAL MEDICINE CLINIC | Facility: CLINIC | Age: 88
End: 2023-10-30
Payer: COMMERCIAL

## 2023-10-30 VITALS
DIASTOLIC BLOOD PRESSURE: 84 MMHG | WEIGHT: 144 LBS | SYSTOLIC BLOOD PRESSURE: 128 MMHG | OXYGEN SATURATION: 96 % | BODY MASS INDEX: 24.59 KG/M2 | HEART RATE: 87 BPM | HEIGHT: 64 IN | TEMPERATURE: 98 F

## 2023-10-30 DIAGNOSIS — Z12.11 SCREENING FOR COLON CANCER: ICD-10-CM

## 2023-10-30 DIAGNOSIS — F33.9 DEPRESSION, RECURRENT (HCC): ICD-10-CM

## 2023-10-30 DIAGNOSIS — J01.00 ACUTE MAXILLARY SINUSITIS, RECURRENCE NOT SPECIFIED: Primary | ICD-10-CM

## 2023-10-30 PROCEDURE — 1160F RVW MEDS BY RX/DR IN RCRD: CPT | Performed by: INTERNAL MEDICINE

## 2023-10-30 PROCEDURE — 3288F FALL RISK ASSESSMENT DOCD: CPT | Performed by: INTERNAL MEDICINE

## 2023-10-30 PROCEDURE — 3725F SCREEN DEPRESSION PERFORMED: CPT | Performed by: INTERNAL MEDICINE

## 2023-10-30 PROCEDURE — 1159F MED LIST DOCD IN RCRD: CPT | Performed by: INTERNAL MEDICINE

## 2023-10-30 PROCEDURE — 1101F PT FALLS ASSESS-DOCD LE1/YR: CPT | Performed by: INTERNAL MEDICINE

## 2023-10-30 PROCEDURE — 99213 OFFICE O/P EST LOW 20 MIN: CPT | Performed by: INTERNAL MEDICINE

## 2023-10-30 RX ORDER — AZITHROMYCIN 500 MG/1
500 TABLET, FILM COATED ORAL DAILY
Qty: 3 TABLET | Refills: 1 | Status: SHIPPED | OUTPATIENT
Start: 2023-10-30 | End: 2023-11-02

## 2023-10-30 NOTE — PROGRESS NOTES
Assessment/Plan:             1. Acute maxillary sinusitis, recurrence not specified  -     azithromycin (ZITHROMAX) 500 MG tablet; Take 1 tablet (500 mg total) by mouth daily for 3 days    2. Screening for colon cancer    3. Depression, recurrent ([address])           Subjective:      Patient ID: Emilio Wong is a 82 y.o. female. Cough, yellow sputum, postnasal drip,    Shortness of Breath  Associated symptoms include coughing. Pertinent negatives include no chest pain, leg swelling or sore throat. Cough  Associated symptoms include headaches and shortness of breath. Pertinent negatives include no chest pain, chills, ear pain, fever, rash or sore throat. Headache      The following portions of the patient's history were reviewed and updated as appropriate: She  has a past medical history of Abnormal weight loss, Allergic rhinitis, Allergic rhinitis due to allergen, Anxiety disorder, Asthma, Body mass index (BMI) 22.0-22.9, adult (01/25/2017), Body mass index (BMI) 23.0-23.9, adult (08/16/2017), Body mass index (BMI) 24.0-24.9, adult (11/23/2015), COPD (chronic obstructive pulmonary disease) ([address]), COVID-19 (06/2022), Crushing injury of multiple sites of trunk, Disorder of rotator cuff, left, Disorder of sacrum, Elevated blood pressure reading without diagnosis of hypertension, Enthesopathy, Hypercholesterolemia, Hyperlipemia, Hyperlipidemia, unspecified, Hypertension, Impaired fasting glucose, Insomnia, Loss of weight, Osteoarthritis, Osteoporosis, Other asthma, Sacrococcygeal disorders, not elsewhere classified, Shingles (11/07/2012), Unspecified osteoarthritis, unspecified site, and Vitamin D deficiency.   She   Patient Active Problem List    Diagnosis Date Noted    Primary osteoarthritis of both ankles 05/10/2023    Primary insomnia 07/26/2022    Simple chronic bronchitis ([address]) 07/26/2022    Depression, recurrent ([address]) 07/26/2022    Tracheitis 05/23/2022    Dizziness 03/23/2022    Urticaria 02/28/2022    Rash 02/28/2022    Cortical age-related cataract of both eyes 02/03/2022    Post-menopausal osteoporosis 09/29/2021    Acute maxillary sinusitis 08/25/2021    Dysthymic disorder 08/25/2021    Acute pain of left foot 05/19/2021    Neuroma of foot 05/19/2021    Allergic reaction 05/10/2021    Cellulitis of left lower extremity 05/10/2021    Martinez neuroma, 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    Colon cancer screening declined 03/24/2021    Mild intermittent asthma without complication 94/89/6900    Rotator cuff arthropathy, left 09/29/2020    Fall (on) (from) other stairs and steps, initial encounter 09/29/2020    Vitamin D deficiency disease 09/14/2020    Mixed hyperlipidemia 09/14/2020    Impaired fasting glucose 09/14/2020     She  has a past surgical history that includes Sinus surgery and Colonoscopy (01/23/2009). Her family history is not on file. She  reports that she quit smoking about 20 years ago. Her smoking use included cigarettes. She started smoking about 71 years ago. She has a 12.75 pack-year smoking history. She has never used smokeless tobacco. She reports current alcohol use. She reports that she does not use drugs. Current Outpatient Medications   Medication Sig Dispense Refill    acetaminophen (TYLENOL) 650 mg CR tablet Take 1 tablet (650 mg total) by mouth every 8 (eight) hours as needed for mild pain She prefers capsule, please give her capsules. 60 tablet 1    azithromycin (ZITHROMAX) 500 MG tablet Take 1 tablet (500 mg total) by mouth daily for 3 days 3 tablet 1    b complex vitamins capsule Take 1 capsule by mouth daily      Diclofenac Sodium (VOLTAREN) 1 % Apply 2 g topically 4 (four) times a day 150 g 0    ergocalciferol (VITAMIN D2) 50,000 units Take 1 capsule (50,000 Units total) by mouth once a week 13 capsule 1    meclizine (ANTIVERT) 25 mg tablet Take 1 tablet (25 mg total) by mouth every 8 (eight) hours as needed for dizziness 30 tablet 0     No current facility-administered medications for this visit. Current Outpatient Medications on File Prior to Visit   Medication Sig    acetaminophen (TYLENOL) 650 mg CR tablet Take 1 tablet (650 mg total) by mouth every 8 (eight) hours as needed for mild pain She prefers capsule, please give her capsules. b complex vitamins capsule Take 1 capsule by mouth daily    Diclofenac Sodium (VOLTAREN) 1 % Apply 2 g topically 4 (four) times a day    ergocalciferol (VITAMIN D2) 50,000 units Take 1 capsule (50,000 Units total) by mouth once a week    meclizine (ANTIVERT) 25 mg tablet Take 1 tablet (25 mg total) by mouth every 8 (eight) hours as needed for dizziness     No current facility-administered medications on file prior to visit. She is allergic to alendronate, biaxin [clarithromycin], clindamycin, raloxifene, risedronate, and iodine - food allergy. .    Review of Systems   Constitutional:  Negative for chills and fever. HENT:  Negative for congestion, ear pain and sore throat. Eyes:  Negative for pain. Respiratory:  Positive for cough and shortness of breath. Cardiovascular:  Negative for chest pain and leg swelling. Gastrointestinal:  Negative for abdominal pain, nausea and vomiting. Endocrine: Negative for polyuria. Genitourinary:  Negative for difficulty urinating, frequency and urgency. Musculoskeletal:  Negative for arthralgias and back pain. Skin:  Negative for rash. Neurological:  Positive for headaches. Negative for weakness. Psychiatric/Behavioral:  Negative for sleep disturbance. The patient is not nervous/anxious. Objective:      /84 (BP Location: Left arm, Patient Position: Sitting, Cuff Size: Standard)   Pulse 87   Temp 98 °F (36.7 °C) (Temporal)   Ht 5' 4" (1.626 m)   Wt 65.3 kg (144 lb)   SpO2 96%   BMI 24.72 kg/m²     No results found for this or any previous visit (from the past 1344 hour(s)). Physical Exam  Constitutional:       Appearance: Normal appearance. HENT:      Head: Normocephalic.       Right Ear: Tympanic membrane, ear canal and external ear normal.      Left Ear: Tympanic membrane, ear canal and external ear normal.      Nose: Nose normal. No congestion. Mouth/Throat:      Mouth: Mucous membranes are moist.      Pharynx: Oropharynx is clear. No oropharyngeal exudate or posterior oropharyngeal erythema. Eyes:      Extraocular Movements: Extraocular movements intact. Conjunctiva/sclera: Conjunctivae normal.   Cardiovascular:      Rate and Rhythm: Normal rate and regular rhythm. Heart sounds: Normal heart sounds. No murmur heard. Pulmonary:      Effort: Pulmonary effort is normal.      Breath sounds: Normal breath sounds. No wheezing or rales. Abdominal:      General: Abdomen is flat. There is no distension. Palpations: Abdomen is soft. Tenderness: There is no abdominal tenderness. Musculoskeletal:      Cervical back: Normal range of motion and neck supple. Right lower leg: No edema. Left lower leg: No edema. Lymphadenopathy:      Cervical: No cervical adenopathy. Skin:     General: Skin is warm. Neurological:      General: No focal deficit present. Mental Status: She is alert and oriented to person, place, and time.

## 2023-11-08 ENCOUNTER — APPOINTMENT (OUTPATIENT)
Dept: LAB | Age: 88
End: 2023-11-08
Payer: COMMERCIAL

## 2023-11-08 ENCOUNTER — RA CDI HCC (OUTPATIENT)
Dept: OTHER | Facility: HOSPITAL | Age: 88
End: 2023-11-08

## 2023-11-08 DIAGNOSIS — R73.01 IMPAIRED FASTING GLUCOSE: ICD-10-CM

## 2023-11-08 DIAGNOSIS — E78.2 MIXED HYPERLIPIDEMIA: ICD-10-CM

## 2023-11-08 LAB
ALBUMIN SERPL BCP-MCNC: 4.2 G/DL (ref 3.5–5)
ALP SERPL-CCNC: 73 U/L (ref 34–104)
ALT SERPL W P-5'-P-CCNC: 13 U/L (ref 7–52)
ANION GAP SERPL CALCULATED.3IONS-SCNC: 5 MMOL/L
AST SERPL W P-5'-P-CCNC: 17 U/L (ref 13–39)
BASOPHILS # BLD AUTO: 0.05 THOUSANDS/ÂΜL (ref 0–0.1)
BASOPHILS NFR BLD AUTO: 1 % (ref 0–1)
BILIRUB SERPL-MCNC: 0.64 MG/DL (ref 0.2–1)
BUN SERPL-MCNC: 12 MG/DL (ref 5–25)
CALCIUM SERPL-MCNC: 9.4 MG/DL (ref 8.4–10.2)
CHLORIDE SERPL-SCNC: 106 MMOL/L (ref 96–108)
CHOLEST SERPL-MCNC: 263 MG/DL
CO2 SERPL-SCNC: 30 MMOL/L (ref 21–32)
CREAT SERPL-MCNC: 0.78 MG/DL (ref 0.6–1.3)
EOSINOPHIL # BLD AUTO: 0.17 THOUSAND/ÂΜL (ref 0–0.61)
EOSINOPHIL NFR BLD AUTO: 3 % (ref 0–6)
ERYTHROCYTE [DISTWIDTH] IN BLOOD BY AUTOMATED COUNT: 14 % (ref 11.6–15.1)
EST. AVERAGE GLUCOSE BLD GHB EST-MCNC: 114 MG/DL
GFR SERPL CREATININE-BSD FRML MDRD: 69 ML/MIN/1.73SQ M
GLUCOSE P FAST SERPL-MCNC: 99 MG/DL (ref 65–99)
HBA1C MFR BLD: 5.6 %
HCT VFR BLD AUTO: 45.1 % (ref 34.8–46.1)
HDLC SERPL-MCNC: 88 MG/DL
HGB BLD-MCNC: 14.4 G/DL (ref 11.5–15.4)
IMM GRANULOCYTES # BLD AUTO: 0.01 THOUSAND/UL (ref 0–0.2)
IMM GRANULOCYTES NFR BLD AUTO: 0 % (ref 0–2)
LDLC SERPL CALC-MCNC: 157 MG/DL (ref 0–100)
LYMPHOCYTES # BLD AUTO: 2.41 THOUSANDS/ÂΜL (ref 0.6–4.47)
LYMPHOCYTES NFR BLD AUTO: 46 % (ref 14–44)
MCH RBC QN AUTO: 29.2 PG (ref 26.8–34.3)
MCHC RBC AUTO-ENTMCNC: 31.9 G/DL (ref 31.4–37.4)
MCV RBC AUTO: 92 FL (ref 82–98)
MONOCYTES # BLD AUTO: 0.47 THOUSAND/ÂΜL (ref 0.17–1.22)
MONOCYTES NFR BLD AUTO: 9 % (ref 4–12)
NEUTROPHILS # BLD AUTO: 2.15 THOUSANDS/ÂΜL (ref 1.85–7.62)
NEUTS SEG NFR BLD AUTO: 41 % (ref 43–75)
NRBC BLD AUTO-RTO: 0 /100 WBCS
PLATELET # BLD AUTO: 410 THOUSANDS/UL (ref 149–390)
PMV BLD AUTO: 9.6 FL (ref 8.9–12.7)
POTASSIUM SERPL-SCNC: 4 MMOL/L (ref 3.5–5.3)
PROT SERPL-MCNC: 6.9 G/DL (ref 6.4–8.4)
RBC # BLD AUTO: 4.93 MILLION/UL (ref 3.81–5.12)
SODIUM SERPL-SCNC: 141 MMOL/L (ref 135–147)
TRIGL SERPL-MCNC: 89 MG/DL
TSH SERPL DL<=0.05 MIU/L-ACNC: 3.04 UIU/ML (ref 0.45–4.5)
WBC # BLD AUTO: 5.26 THOUSAND/UL (ref 4.31–10.16)

## 2023-11-08 PROCEDURE — 36415 COLL VENOUS BLD VENIPUNCTURE: CPT

## 2023-11-08 PROCEDURE — 83036 HEMOGLOBIN GLYCOSYLATED A1C: CPT

## 2023-11-08 PROCEDURE — 80053 COMPREHEN METABOLIC PANEL: CPT

## 2023-11-08 PROCEDURE — 84443 ASSAY THYROID STIM HORMONE: CPT

## 2023-11-08 PROCEDURE — 85025 COMPLETE CBC W/AUTO DIFF WBC: CPT

## 2023-11-08 PROCEDURE — 80061 LIPID PANEL: CPT

## 2023-11-08 NOTE — PROGRESS NOTES
[address] [facility] coding opportunities       Chart reviewed, no opportunity found: [id number]        Patients Insurance     Medicare Insurance: [employer]

## 2023-11-14 ENCOUNTER — OFFICE VISIT (OUTPATIENT)
Dept: INTERNAL MEDICINE CLINIC | Facility: CLINIC | Age: 88
End: 2023-11-14
Payer: COMMERCIAL

## 2023-11-14 VITALS
OXYGEN SATURATION: 97 % | DIASTOLIC BLOOD PRESSURE: 82 MMHG | HEART RATE: 84 BPM | SYSTOLIC BLOOD PRESSURE: 132 MMHG | WEIGHT: 140 LBS | HEIGHT: 64 IN | TEMPERATURE: 97.5 F | BODY MASS INDEX: 23.9 KG/M2

## 2023-11-14 DIAGNOSIS — E78.2 MIXED HYPERLIPIDEMIA: Primary | ICD-10-CM

## 2023-11-14 DIAGNOSIS — E55.9 VITAMIN D DEFICIENCY DISEASE: ICD-10-CM

## 2023-11-14 DIAGNOSIS — Z23 ENCOUNTER FOR IMMUNIZATION: ICD-10-CM

## 2023-11-14 DIAGNOSIS — Z00.00 MEDICARE ANNUAL WELLNESS VISIT, SUBSEQUENT: ICD-10-CM

## 2023-11-14 PROCEDURE — G0439 PPPS, SUBSEQ VISIT: HCPCS | Performed by: INTERNAL MEDICINE

## 2023-11-14 PROCEDURE — 90662 IIV NO PRSV INCREASED AG IM: CPT

## 2023-11-14 PROCEDURE — G0008 ADMIN INFLUENZA VIRUS VAC: HCPCS

## 2023-11-14 PROCEDURE — 99214 OFFICE O/P EST MOD 30 MIN: CPT | Performed by: INTERNAL MEDICINE

## 2023-11-14 RX ORDER — ERGOCALCIFEROL 1.25 MG/1
50000 CAPSULE ORAL WEEKLY
Qty: 13 CAPSULE | Refills: 1 | Status: SHIPPED | OUTPATIENT
Start: 2023-11-14

## 2023-11-14 NOTE — PROGRESS NOTES
Assessment and Plan:     Problem List Items Addressed This Visit          Other    Vitamin D deficiency disease    Relevant Medications    ergocalciferol (VITAMIN D2) 50,000 units    Mixed hyperlipidemia - Primary    Medicare annual wellness visit, subsequent        Preventive health issues were discussed with patient, and age appropriate screening tests were ordered as noted in patient's After Visit Summary. Personalized health advice and appropriate referrals for health education or preventive services given if needed, as noted in patient's After Visit Summary. History of Present Illness:     Patient presents for a Medicare Wellness Visit    Follow-up on blood test done on 11/8/2023 test discussed with her, also Medicare wellness exam     Patient Care Team:  Minerva Keller MD as PCP - General (Internal Medicine)     Review of Systems:     Review of Systems   Constitutional:  Negative for chills and fever. HENT:  Negative for congestion, ear pain and sore throat. Eyes:  Negative for pain. Respiratory:  Negative for cough and shortness of breath. Cardiovascular:  Negative for chest pain and leg swelling. Gastrointestinal:  Negative for abdominal pain, nausea and vomiting. Endocrine: Negative for polyuria. Genitourinary:  Negative for difficulty urinating, frequency and urgency. Musculoskeletal:  Positive for arthralgias. Negative for back pain. Skin:  Negative for rash. Neurological:  Negative for weakness and headaches. Psychiatric/Behavioral:  Negative for sleep disturbance. The patient is not nervous/anxious.          Problem List:     Patient Active Problem List   Diagnosis   • Vitamin D deficiency disease   • Mixed hyperlipidemia   • Impaired fasting glucose   • Rotator cuff arthropathy, left   • Fall (on) (from) other stairs and steps, initial encounter   • Mild intermittent asthma without complication   • Martinez neuroma, left   • Colon cancer screening declined   • Medicare annual wellness visit, subsequent   • Allergic reaction   • Cellulitis of left lower extremity   • Acute pain of left foot   • Neuroma of foot   • Acute maxillary sinusitis   • Dysthymic disorder   • Post-menopausal osteoporosis   • Cortical age-related cataract of both eyes   • Urticaria   • Rash   • Dizziness   • Tracheitis   • Primary insomnia   • Simple chronic bronchitis (HCC)   • Depression, recurrent (HCC)   • Primary osteoarthritis of both ankles      Past Medical and Surgical History:     Past Medical History:   Diagnosis Date   • Abnormal weight loss    • Allergic rhinitis     NOS   • Allergic rhinitis due to allergen    • Anxiety disorder    • Asthma     NOS   • Body mass index (BMI) 22.0-22.9, adult 01/25/2017   • Body mass index (BMI) 23.0-23.9, adult 08/16/2017   • Body mass index (BMI) 24.0-24.9, adult 11/23/2015   • COPD (chronic obstructive pulmonary disease) ([address])    • COVID-19 06/2022   • Crushing injury of multiple sites of trunk    • Disorder of rotator cuff, left    • Disorder of sacrum    • Elevated blood pressure reading without diagnosis of hypertension    • Enthesopathy    • Hypercholesterolemia    • Hyperlipemia    • Hyperlipidemia, unspecified    • Hypertension    • Impaired fasting glucose    • Insomnia    • Loss of weight    • Osteoarthritis    • Osteoporosis    • Other asthma    • Sacrococcygeal disorders, not elsewhere classified    • Shingles 11/07/2012    T6-7 dermatome, right   • Unspecified osteoarthritis, unspecified site    • Vitamin D deficiency      Past Surgical History:   Procedure Laterality Date   • COLONOSCOPY  01/23/2009    3 broad-based polyps found in the ascending colon and hepatic flexure; all polyps removed by snare cautery polypectomy. Colonoscopy recommended in 5 years. Dr. Arreola [facility]    • SINUS SURGERY        Family History:     History reviewed. No pertinent family history.    Social History:     Social History     Socioeconomic History   • Marital status:      Spouse name: None   • Number of children: None   • Years of education: None   • Highest education level: None   Occupational History   • None   Tobacco Use   • Smoking status: Former     Packs/day: 0.25     Years: 51.00     Total pack years: 12.75     Types: Cigarettes     Start date:      Quit date:      Years since quittin.8   • Smokeless tobacco: Never   • Tobacco comments:     last smoked > 10 years - As per [employer]   Vaping Use   • Vaping Use: Never used   Substance and Sexual Activity   • Alcohol use: Yes     Comment: Alcohol: Socially - As per [employer]    • Drug use: Never   • Sexual activity: None   Other Topics Concern   • None   Social History Narrative   • None     Social Determinants of Health     Financial Resource Strain: Low Risk  (2023)    Overall Financial Resource Strain (CARDIA)    • Difficulty of Paying Living Expenses: Not hard at all   Food Insecurity: Not on file   Transportation Needs: No Transportation Needs (2023)    PRAPARE - Transportation    • Lack of Transportation (Medical): No    • Lack of Transportation (Non-Medical): No   Physical Activity: Not on file   Stress: Not on file   Social Connections: Not on file   Intimate Partner Violence: Not on file   Housing Stability: Not on file      Medications and Allergies:     Current Outpatient Medications   Medication Sig Dispense Refill   • acetaminophen (TYLENOL) 650 mg CR tablet Take 1 tablet (650 mg total) by mouth every 8 (eight) hours as needed for mild pain She prefers capsule, please give her capsules.  60 tablet 1   • b complex vitamins capsule Take 1 capsule by mouth daily     • Diclofenac Sodium (VOLTAREN) 1 % Apply 2 g topically 4 (four) times a day 150 g 0   • ergocalciferol (VITAMIN D2) 50,000 units Take 1 capsule (50,000 Units total) by mouth once a week 13 capsule 1   • meclizine (ANTIVERT) 25 mg tablet Take 1 tablet (25 mg total) by mouth every 8 (eight) hours as needed for dizziness 30 tablet 0     No current facility-administered medications for this visit. Allergies   Allergen Reactions   • Alendronate Hives   • Biaxin [Clarithromycin]    • Clindamycin    • Raloxifene Hives   • Risedronate Hives   • Iodine - Food Allergy Rash      Immunizations:     Immunization History   Administered Date(s) Administered   • COVID-19 PFIZER VACCINE 0.3 ML IM 01/23/2021, 02/12/2021, 10/14/2021   • DTaP 09/28/2019   • DTaP,unspecified 09/28/2019   • H1N1, All Formulations 12/18/2009   • INFLUENZA 10/16/2014, 11/09/2022   • Influenza, high dose seasonal 0.7 mL 10/23/2020, 09/29/2021, 11/09/2022   • Pneumococcal 10/20/2006   • Pneumococcal Conjugate 13-Valent 10/09/2015   • Pneumococcal Conjugate PCV 7 10/20/2006   • Pneumococcal Conjugate Vaccine 20-valent (Pcv20), Polysace 11/09/2022   • Tdap 09/28/2018   • Zoster 08/04/2008   • Zoster Vaccine Recombinant 08/28/2018, 09/28/2018, 01/15/2019, 01/15/2019      Health Maintenance:         Topic Date Due   • Colorectal Cancer Screening  01/23/2014         Topic Date Due   • COVID-19 Vaccine (4 - Pfizer series) 12/09/2021   • Influenza Vaccine (1) 09/01/2023      Medicare Screening Tests and Risk Assessments:     Danielle Marrero is here for her Subsequent Wellness visit. Last Medicare Wellness visit information reviewed, patient interviewed and updates made to the record today. Health Risk Assessment:   Patient rates overall health as good. Patient feels that their physical health rating is same. Patient is very satisfied with their life. Eyesight was rated as same. Hearing was rated as same. Patient feels that their emotional and mental health rating is same. Patients states they are never, rarely angry. Patient states they are sometimes unusually tired/fatigued. Pain experienced in the last 7 days has been none. Patient states that she has experienced no weight loss or gain in last 6 months.      Depression Screening:   PHQ-9 Score: 0      Fall Risk Screening: In the past year, patient has experienced: no history of falling in past year      Urinary Incontinence Screening:   Patient has not leaked urine accidently in the last six months. Home Safety:  Patient does not have trouble with stairs inside or outside of their home. Patient has working smoke alarms and has working carbon monoxide detector. Home safety hazards include: none. Nutrition:   Current diet is Regular. Medications:   Patient is currently taking over-the-counter supplements. OTC medications include: see medication list. Patient is able to manage medications. Activities of Daily Living (ADLs)/Instrumental Activities of Daily Living (IADLs):   Walk and transfer into and out of bed and chair?: Yes  Dress and groom yourself?: Yes    Bathe or shower yourself?: Yes    Feed yourself?  Yes  Do your laundry/housekeeping?: Yes  Manage your money, pay your bills and track your expenses?: Yes  Make your own meals?: Yes    Do your own shopping?: Yes    Previous Hospitalizations:   Any hospitalizations or ED visits within the last 12 months?: No      Advance Care Planning:   Living will: No    Durable POA for healthcare: No    Advanced directive: No    ACP document given: Yes      Cognitive Screening:   Provider or family/friend/caregiver concerned regarding cognition?: No    PREVENTIVE SCREENINGS      Cardiovascular Screening:    General: Screening Not Indicated and History Lipid Disorder      Diabetes Screening:     General: Screening Current      Colorectal Cancer Screening:     General: Screening Not Indicated      Cervical Cancer Screening:    General: Screening Not Indicated      Osteoporosis Screening:    General: Screening Not Indicated and History Osteoporosis      Lung Cancer Screening:     General: Screening Not Indicated    Screening, Brief Intervention, and Referral to Treatment (SBIRT)    Screening  Typical number of drinks in a day: 0  Typical number of drinks in a week: 0  Interpretation: Low risk drinking behavior. Single Item Drug Screening:  How often have you used an illegal drug (including marijuana) or a prescription medication for non-medical reasons in the past year? never    Single Item Drug Screen Score: 0  Interpretation: Negative screen for possible drug use disorder    No results found. Physical Exam:     /82 (BP Location: Left arm, Patient Position: Sitting, Cuff Size: Standard)   Pulse 84   Temp 97.5 °F (36.4 °C) (Temporal)   Ht 5' 4" (1.626 m)   Wt 63.5 kg (140 lb)   SpO2 97%   BMI 24.03 kg/m²     Physical Exam  Vitals and nursing note reviewed. Constitutional:       General: She is not in acute distress. Appearance: She is well-developed. HENT:      Head: Normocephalic and atraumatic. Right Ear: Tympanic membrane, ear canal and external ear normal.      Left Ear: Tympanic membrane, ear canal and external ear normal.      Mouth/Throat:      Pharynx: Oropharynx is clear. Eyes:      Extraocular Movements: Extraocular movements intact. Conjunctiva/sclera: Conjunctivae normal.   Cardiovascular:      Rate and Rhythm: Normal rate and regular rhythm. Heart sounds: Normal heart sounds. No murmur heard. Pulmonary:      Effort: Pulmonary effort is normal. No respiratory distress. Breath sounds: Normal breath sounds. Abdominal:      General: Abdomen is flat. Palpations: Abdomen is soft. Tenderness: There is no abdominal tenderness. Musculoskeletal:         General: No swelling. Cervical back: Neck supple. Right lower leg: No edema. Left lower leg: No edema. Skin:     General: Skin is warm and dry. Capillary Refill: Capillary refill takes less than 2 seconds. Neurological:      General: No focal deficit present. Mental Status: She is alert and oriented to person, place, and time.    Psychiatric:         Mood and Affect: Mood normal.        Chelsea Corbett MD

## 2023-12-29 PROBLEM — J01.00 ACUTE MAXILLARY SINUSITIS: Status: RESOLVED | Noted: 2021-08-25 | Resolved: 2023-12-29

## 2024-01-13 PROBLEM — Z00.00 MEDICARE ANNUAL WELLNESS VISIT, SUBSEQUENT: Status: RESOLVED | Noted: 2021-05-10 | Resolved: 2024-01-13

## 2024-02-05 ENCOUNTER — TELEPHONE (OUTPATIENT)
Dept: INTERNAL MEDICINE CLINIC | Facility: CLINIC | Age: 89
End: 2024-02-05

## 2024-02-05 DIAGNOSIS — J01.00 ACUTE MAXILLARY SINUSITIS, RECURRENCE NOT SPECIFIED: Primary | ICD-10-CM

## 2024-02-05 RX ORDER — AZITHROMYCIN 500 MG/1
500 TABLET, FILM COATED ORAL DAILY
Qty: 3 TABLET | Refills: 1 | Status: SHIPPED | OUTPATIENT
Start: 2024-02-05 | End: 2024-02-08

## 2024-02-05 NOTE — TELEPHONE ENCOUNTER
Patient called and stated she needs an antibiotic she has a sinus infection [facility]  really bad sinus infection

## 2024-05-06 ENCOUNTER — TELEPHONE (OUTPATIENT)
Age: 89
End: 2024-05-06

## 2024-05-06 DIAGNOSIS — E55.9 VITAMIN D DEFICIENCY DISEASE: Primary | ICD-10-CM

## 2024-05-06 DIAGNOSIS — E78.2 MIXED HYPERLIPIDEMIA: ICD-10-CM

## 2024-05-06 DIAGNOSIS — R73.01 IMPAIRED FASTING GLUCOSE: ICD-10-CM

## 2024-05-06 NOTE — TELEPHONE ENCOUNTER
Patient called in stating, upcoming appointment with Dr Shankar on 05/14/2024, and wants to know if blood work needs to be completed before the appointment.     If so, patient wants to make sure the script is put in, and can get it done at [facility] before the date of services, to confirm results are in before the 14th. Patient wants verification if, she needs to fast for 8-12 hours for this blood work also.     Please advise back to patient once script has been put in, or if any additional questions.     Patient also stated leave a voicemail if she is unable to get to the phone.

## 2024-05-09 ENCOUNTER — APPOINTMENT (OUTPATIENT)
Dept: LAB | Age: 89
End: 2024-05-09
Payer: COMMERCIAL

## 2024-05-09 DIAGNOSIS — R73.01 IMPAIRED FASTING GLUCOSE: ICD-10-CM

## 2024-05-09 DIAGNOSIS — E55.9 VITAMIN D DEFICIENCY DISEASE: ICD-10-CM

## 2024-05-09 DIAGNOSIS — E78.2 MIXED HYPERLIPIDEMIA: ICD-10-CM

## 2024-05-09 LAB
25(OH)D3 SERPL-MCNC: 61.9 NG/ML (ref 30–100)
ALBUMIN SERPL BCP-MCNC: 4.1 G/DL (ref 3.5–5)
ALP SERPL-CCNC: 73 U/L (ref 34–104)
ALT SERPL W P-5'-P-CCNC: 10 U/L (ref 7–52)
ANION GAP SERPL CALCULATED.3IONS-SCNC: 7 MMOL/L (ref 4–13)
AST SERPL W P-5'-P-CCNC: 16 U/L (ref 13–39)
BASOPHILS # BLD AUTO: 0.05 THOUSANDS/ÂΜL (ref 0–0.1)
BASOPHILS NFR BLD AUTO: 1 % (ref 0–1)
BILIRUB SERPL-MCNC: 0.69 MG/DL (ref 0.2–1)
BUN SERPL-MCNC: 15 MG/DL (ref 5–25)
CALCIUM SERPL-MCNC: 9.8 MG/DL (ref 8.4–10.2)
CHLORIDE SERPL-SCNC: 105 MMOL/L (ref 96–108)
CHOLEST SERPL-MCNC: 264 MG/DL
CO2 SERPL-SCNC: 30 MMOL/L (ref 21–32)
CREAT SERPL-MCNC: 0.75 MG/DL (ref 0.6–1.3)
EOSINOPHIL # BLD AUTO: 0.15 THOUSAND/ÂΜL (ref 0–0.61)
EOSINOPHIL NFR BLD AUTO: 3 % (ref 0–6)
ERYTHROCYTE [DISTWIDTH] IN BLOOD BY AUTOMATED COUNT: 13.4 % (ref 11.6–15.1)
EST. AVERAGE GLUCOSE BLD GHB EST-MCNC: 111 MG/DL
GFR SERPL CREATININE-BSD FRML MDRD: 72 ML/MIN/1.73SQ M
GLUCOSE P FAST SERPL-MCNC: 105 MG/DL (ref 65–99)
HBA1C MFR BLD: 5.5 %
HCT VFR BLD AUTO: 44.3 % (ref 34.8–46.1)
HDLC SERPL-MCNC: 82 MG/DL
HGB BLD-MCNC: 14.3 G/DL (ref 11.5–15.4)
IMM GRANULOCYTES # BLD AUTO: 0.01 THOUSAND/UL (ref 0–0.2)
IMM GRANULOCYTES NFR BLD AUTO: 0 % (ref 0–2)
LDLC SERPL CALC-MCNC: 166 MG/DL (ref 0–100)
LYMPHOCYTES # BLD AUTO: 2.41 THOUSANDS/ÂΜL (ref 0.6–4.47)
LYMPHOCYTES NFR BLD AUTO: 44 % (ref 14–44)
MCH RBC QN AUTO: 29.2 PG (ref 26.8–34.3)
MCHC RBC AUTO-ENTMCNC: 32.3 G/DL (ref 31.4–37.4)
MCV RBC AUTO: 91 FL (ref 82–98)
MONOCYTES # BLD AUTO: 0.47 THOUSAND/ÂΜL (ref 0.17–1.22)
MONOCYTES NFR BLD AUTO: 9 % (ref 4–12)
NEUTROPHILS # BLD AUTO: 2.29 THOUSANDS/ÂΜL (ref 1.85–7.62)
NEUTS SEG NFR BLD AUTO: 43 % (ref 43–75)
NRBC BLD AUTO-RTO: 0 /100 WBCS
PLATELET # BLD AUTO: 366 THOUSANDS/UL (ref 149–390)
PMV BLD AUTO: 9.7 FL (ref 8.9–12.7)
POTASSIUM SERPL-SCNC: 4.3 MMOL/L (ref 3.5–5.3)
PROT SERPL-MCNC: 6.9 G/DL (ref 6.4–8.4)
RBC # BLD AUTO: 4.89 MILLION/UL (ref 3.81–5.12)
SODIUM SERPL-SCNC: 142 MMOL/L (ref 135–147)
TRIGL SERPL-MCNC: 78 MG/DL
TSH SERPL DL<=0.05 MIU/L-ACNC: 2.88 UIU/ML (ref 0.45–4.5)
WBC # BLD AUTO: 5.38 THOUSAND/UL (ref 4.31–10.16)

## 2024-05-09 PROCEDURE — 36415 COLL VENOUS BLD VENIPUNCTURE: CPT

## 2024-05-09 PROCEDURE — 84443 ASSAY THYROID STIM HORMONE: CPT

## 2024-05-09 PROCEDURE — 82306 VITAMIN D 25 HYDROXY: CPT

## 2024-05-09 PROCEDURE — 85025 COMPLETE CBC W/AUTO DIFF WBC: CPT

## 2024-05-09 PROCEDURE — 80061 LIPID PANEL: CPT

## 2024-05-09 PROCEDURE — 83036 HEMOGLOBIN GLYCOSYLATED A1C: CPT

## 2024-05-09 PROCEDURE — 80053 COMPREHEN METABOLIC PANEL: CPT

## 2024-05-14 ENCOUNTER — OFFICE VISIT (OUTPATIENT)
Dept: INTERNAL MEDICINE CLINIC | Facility: CLINIC | Age: 89
End: 2024-05-14
Payer: COMMERCIAL

## 2024-05-14 VITALS
HEART RATE: 72 BPM | OXYGEN SATURATION: 97 % | BODY MASS INDEX: 24.24 KG/M2 | TEMPERATURE: 97.4 F | WEIGHT: 142 LBS | SYSTOLIC BLOOD PRESSURE: 128 MMHG | DIASTOLIC BLOOD PRESSURE: 78 MMHG | HEIGHT: 64 IN

## 2024-05-14 DIAGNOSIS — E55.9 VITAMIN D DEFICIENCY DISEASE: ICD-10-CM

## 2024-05-14 DIAGNOSIS — J45.20 MILD INTERMITTENT ASTHMA WITHOUT COMPLICATION: ICD-10-CM

## 2024-05-14 DIAGNOSIS — M12.812 ROTATOR CUFF ARTHROPATHY, LEFT: ICD-10-CM

## 2024-05-14 DIAGNOSIS — J04.10 TRACHEITIS: ICD-10-CM

## 2024-05-14 DIAGNOSIS — E78.2 MIXED HYPERLIPIDEMIA: Primary | ICD-10-CM

## 2024-05-14 PROCEDURE — G2211 COMPLEX E/M VISIT ADD ON: HCPCS | Performed by: INTERNAL MEDICINE

## 2024-05-14 PROCEDURE — 99214 OFFICE O/P EST MOD 30 MIN: CPT | Performed by: INTERNAL MEDICINE

## 2024-05-14 RX ORDER — ERGOCALCIFEROL 1.25 MG/1
50000 CAPSULE ORAL WEEKLY
Qty: 13 CAPSULE | Refills: 1 | Status: SHIPPED | OUTPATIENT
Start: 2024-05-14

## 2024-05-14 RX ORDER — AZITHROMYCIN 250 MG/1
TABLET, FILM COATED ORAL DAILY
Qty: 6 TABLET | Refills: 1 | Status: SHIPPED | OUTPATIENT
Start: 2024-05-14 | End: 2024-05-19

## 2024-05-14 NOTE — PROGRESS NOTES
Assessment/Plan:             1. Mixed hyperlipidemia  Comments:  diet and exercise advised    2. Tracheitis  -     azithromycin (Zithromax) 250 mg tablet; Take 2 tablets (500 mg total) by mouth daily for 1 day, THEN 1 tablet (250 mg total) daily for 4 days.    3. Vitamin D deficiency disease  -     ergocalciferol (VITAMIN D2) 50,000 units; Take 1 capsule (50,000 Units total) by mouth once a week    4. Rotator cuff arthropathy, left    5. Mild intermittent asthma without complication           Subjective:      Patient ID: Ceci Galeas is a 88 y.o. female.    Follow-up on blood test done on 5/9/2024 test discussed with her, cough, congestion,    Dizziness  Associated symptoms include fatigue, headaches and a sore throat. Pertinent negatives include no abdominal pain, arthralgias, chest pain, chills, congestion, coughing, fever, nausea, rash, vomiting or weakness.   Sore Throat   Associated symptoms include ear pain and headaches. Pertinent negatives include no abdominal pain, congestion, coughing, shortness of breath or vomiting.   Headache  Fatigue  Associated symptoms include fatigue, headaches and a sore throat. Pertinent negatives include no abdominal pain, arthralgias, chest pain, chills, congestion, coughing, fever, nausea, rash, vomiting or weakness.   Earache   Associated symptoms include headaches and a sore throat. Pertinent negatives include no abdominal pain, coughing, rash or vomiting.       The following portions of the patient's history were reviewed and updated as appropriate: She  has a past medical history of Abnormal weight loss, Allergic rhinitis, Allergic rhinitis due to allergen, Anxiety disorder, Asthma, Body mass index (BMI) 22.0-22.9, adult (01/25/2017), Body mass index (BMI) 23.0-23.9, adult (08/16/2017), Body mass index (BMI) 24.0-24.9, adult (11/23/2015), COPD (chronic obstructive pulmonary disease) (HCC), COVID-19 (06/2022), Crushing injury of multiple sites of trunk, Disorder of rotator  cuff, left, Disorder of sacrum, Elevated blood pressure reading without diagnosis of hypertension, Enthesopathy, Hypercholesterolemia, Hyperlipemia, Hyperlipidemia, unspecified, Hypertension, Impaired fasting glucose, Insomnia, Loss of weight, Osteoarthritis, Osteoporosis, Other asthma, Sacrococcygeal disorders, not elsewhere classified, Shingles (11/07/2012), Unspecified osteoarthritis, unspecified site, and Vitamin D deficiency.  She   Patient Active Problem List    Diagnosis Date Noted    Primary osteoarthritis of both ankles 05/10/2023    Primary insomnia 07/26/2022    Simple chronic bronchitis (HCC) 07/26/2022    Depression, recurrent (HCC) 07/26/2022    Tracheitis 05/23/2022    Dizziness 03/23/2022    Urticaria 02/28/2022    Rash 02/28/2022    Cortical age-related cataract of both eyes 02/03/2022    Post-menopausal osteoporosis 09/29/2021    Dysthymic disorder 08/25/2021    Acute pain of left foot 05/19/2021    Neuroma of foot 05/19/2021    Allergic reaction 05/10/2021    Cellulitis of left lower extremity 05/10/2021    Martinez neuroma, 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    Colon cancer screening declined 03/24/2021    Mild intermittent asthma without complication 10/21/2020    Rotator cuff arthropathy, left 09/29/2020    Fall (on) (from) other stairs and steps, initial encounter 09/29/2020    Vitamin D deficiency disease 09/14/2020    Mixed hyperlipidemia 09/14/2020    Impaired fasting glucose 09/14/2020     She  has a past surgical history that includes Sinus surgery and Colonoscopy (01/23/2009).  Her family history is not on file.  She  reports that she quit smoking about 21 years ago. Her smoking use included cigarettes. She started smoking about 72 years ago. She has a 12.8 pack-year smoking history. She has never used smokeless tobacco. She reports current alcohol use. She reports that she does not use drugs.  Current Outpatient Medications   Medication Sig Dispense Refill    acetaminophen (TYLENOL) 650 mg CR tablet Take 1  tablet (650 mg total) by mouth every 8 (eight) hours as needed for mild pain She prefers capsule, please give her capsules. 60 tablet 1    azithromycin (Zithromax) 250 mg tablet Take 2 tablets (500 mg total) by mouth daily for 1 day, THEN 1 tablet (250 mg total) daily for 4 days. 6 tablet 1    b complex vitamins capsule Take 1 capsule by mouth daily      ergocalciferol (VITAMIN D2) 50,000 units Take 1 capsule (50,000 Units total) by mouth once a week 13 capsule 1    meclizine (ANTIVERT) 25 mg tablet Take 1 tablet (25 mg total) by mouth every 8 (eight) hours as needed for dizziness 30 tablet 0    Diclofenac Sodium (VOLTAREN) 1 % Apply 2 g topically 4 (four) times a day (Patient not taking: Reported on 5/14/2024) 150 g 0     No current facility-administered medications for this visit.     Current Outpatient Medications on File Prior to Visit   Medication Sig    acetaminophen (TYLENOL) 650 mg CR tablet Take 1 tablet (650 mg total) by mouth every 8 (eight) hours as needed for mild pain She prefers capsule, please give her capsules.    b complex vitamins capsule Take 1 capsule by mouth daily    meclizine (ANTIVERT) 25 mg tablet Take 1 tablet (25 mg total) by mouth every 8 (eight) hours as needed for dizziness    [DISCONTINUED] ergocalciferol (VITAMIN D2) 50,000 units Take 1 capsule (50,000 Units total) by mouth once a week    Diclofenac Sodium (VOLTAREN) 1 % Apply 2 g topically 4 (four) times a day (Patient not taking: Reported on 5/14/2024)     No current facility-administered medications on file prior to visit.     She is allergic to alendronate, biaxin [clarithromycin], clindamycin, raloxifene, risedronate, and iodine - food allergy..    Review of Systems   Constitutional:  Positive for fatigue. Negative for chills and fever.   HENT:  Positive for ear pain and sore throat. Negative for congestion.    Eyes:  Negative for pain.   Respiratory:  Negative for cough and shortness of breath.    Cardiovascular:  Negative for  "chest pain and leg swelling.   Gastrointestinal:  Negative for abdominal pain, nausea and vomiting.   Endocrine: Negative for polyuria.   Genitourinary:  Negative for difficulty urinating, frequency and urgency.   Musculoskeletal:  Negative for arthralgias and back pain.   Skin:  Negative for rash.   Neurological:  Positive for dizziness and headaches. Negative for weakness.   Psychiatric/Behavioral:  Negative for sleep disturbance. The patient is not nervous/anxious.          Objective:      /78 (BP Location: Left arm, Patient Position: Standing, Cuff Size: Standard)   Pulse 72   Temp (!) 97.4 °F (36.3 °C) (Temporal)   Ht 5' 4\" (1.626 m)   Wt 64.4 kg (142 lb)   SpO2 97%   BMI 24.37 kg/m²     Recent Results (from the past 1344 hour(s))   CBC and differential    Collection Time: 05/09/24  7:14 AM   Result Value Ref Range    WBC 5.38 4.31 - 10.16 Thousand/uL    RBC 4.89 3.81 - 5.12 Million/uL    Hemoglobin 14.3 11.5 - 15.4 g/dL    Hematocrit 44.3 34.8 - 46.1 %    MCV 91 82 - 98 fL    MCH 29.2 26.8 - 34.3 pg    MCHC 32.3 31.4 - 37.4 g/dL    RDW 13.4 11.6 - 15.1 %    MPV 9.7 8.9 - 12.7 fL    Platelets 366 149 - 390 Thousands/uL    nRBC 0 /100 WBCs    Segmented % 43 43 - 75 %    Immature Grans % 0 0 - 2 %    Lymphocytes % 44 14 - 44 %    Monocytes % 9 4 - 12 %    Eosinophils Relative 3 0 - 6 %    Basophils Relative 1 0 - 1 %    Absolute Neutrophils 2.29 1.85 - 7.62 Thousands/µL    Absolute Immature Grans 0.01 0.00 - 0.20 Thousand/uL    Absolute Lymphocytes 2.41 0.60 - 4.47 Thousands/µL    Absolute Monocytes 0.47 0.17 - 1.22 Thousand/µL    Eosinophils Absolute 0.15 0.00 - 0.61 Thousand/µL    Basophils Absolute 0.05 0.00 - 0.10 Thousands/µL   Comprehensive metabolic panel    Collection Time: 05/09/24  7:14 AM   Result Value Ref Range    Sodium 142 135 - 147 mmol/L    Potassium 4.3 3.5 - 5.3 mmol/L    Chloride 105 96 - 108 mmol/L    CO2 30 21 - 32 mmol/L    ANION GAP 7 4 - 13 mmol/L    BUN 15 5 - 25 mg/dL    " Creatinine 0.75 0.60 - 1.30 mg/dL    Glucose, Fasting 105 (H) 65 - 99 mg/dL    Calcium 9.8 8.4 - 10.2 mg/dL    AST 16 13 - 39 U/L    ALT 10 7 - 52 U/L    Alkaline Phosphatase 73 34 - 104 U/L    Total Protein 6.9 6.4 - 8.4 g/dL    Albumin 4.1 3.5 - 5.0 g/dL    Total Bilirubin 0.69 0.20 - 1.00 mg/dL    eGFR 72 ml/min/1.73sq m   Lipid Panel with Direct LDL reflex    Collection Time: 05/09/24  7:14 AM   Result Value Ref Range    Cholesterol 264 (H) See Comment mg/dL    Triglycerides 78 See Comment mg/dL    HDL, Direct 82 >=50 mg/dL    LDL Calculated 166 (H) 0 - 100 mg/dL   Vitamin D 25 hydroxy    Collection Time: 05/09/24  7:14 AM   Result Value Ref Range    Vit D, 25-Hydroxy 61.9 30.0 - 100.0 ng/mL   TSH, 3rd generation    Collection Time: 05/09/24  7:14 AM   Result Value Ref Range    TSH 3RD GENERATON 2.884 0.450 - 4.500 uIU/mL   Hemoglobin A1C    Collection Time: 05/09/24  7:14 AM   Result Value Ref Range    Hemoglobin A1C 5.5 Normal 4.0-5.6%; PreDiabetic 5.7-6.4%; Diabetic >=6.5%; Glycemic control for adults with diabetes <7.0% %     mg/dl        Physical Exam  Constitutional:       Appearance: Normal appearance.   HENT:      Head: Normocephalic.      Right Ear: Tympanic membrane, ear canal and external ear normal.      Left Ear: Tympanic membrane, ear canal and external ear normal.      Nose: Nose normal. No congestion.      Mouth/Throat:      Mouth: Mucous membranes are moist.      Pharynx: Oropharynx is clear. No oropharyngeal exudate or posterior oropharyngeal erythema.   Eyes:      Extraocular Movements: Extraocular movements intact.      Conjunctiva/sclera: Conjunctivae normal.   Cardiovascular:      Rate and Rhythm: Normal rate and regular rhythm.      Heart sounds: Normal heart sounds. No murmur heard.  Pulmonary:      Effort: Pulmonary effort is normal.      Breath sounds: Wheezing present. No rales.   Abdominal:      General: Abdomen is flat. There is no distension.      Palpations: Abdomen is soft.       Tenderness: There is no abdominal tenderness.   Musculoskeletal:      Cervical back: Normal range of motion and neck supple.      Right lower leg: No edema.      Left lower leg: No edema.   Lymphadenopathy:      Cervical: No cervical adenopathy.   Skin:     General: Skin is warm.   Neurological:      General: No focal deficit present.      Mental Status: She is alert and oriented to person, place, and time.

## 2024-06-19 DIAGNOSIS — E55.9 VITAMIN D DEFICIENCY DISEASE: ICD-10-CM

## 2024-06-19 RX ORDER — ERGOCALCIFEROL 1.25 MG/1
50000 CAPSULE ORAL WEEKLY
Qty: 13 CAPSULE | Refills: 1 | Status: SHIPPED | OUTPATIENT
Start: 2024-06-19

## 2024-08-28 ENCOUNTER — TELEPHONE (OUTPATIENT)
Age: 89
End: 2024-08-28

## 2024-08-28 DIAGNOSIS — J01.00 ACUTE MAXILLARY SINUSITIS, RECURRENCE NOT SPECIFIED: Primary | ICD-10-CM

## 2024-08-28 RX ORDER — AZITHROMYCIN 500 MG/1
500 TABLET, FILM COATED ORAL DAILY
Qty: 3 TABLET | Refills: 1 | Status: SHIPPED | OUTPATIENT
Start: 2024-08-28 | End: 2024-08-31

## 2024-08-28 NOTE — TELEPHONE ENCOUNTER
Pt called to ask if Dr. Shankar can [facility] an [facility] [place] She stated he has done this before and it is too hot for her to be traveling to the office. Please advise pt once the script has been sent, thank you.

## 2024-10-09 DIAGNOSIS — E55.9 VITAMIN D DEFICIENCY DISEASE: ICD-10-CM

## 2024-10-09 DIAGNOSIS — E78.2 MIXED HYPERLIPIDEMIA: Primary | ICD-10-CM

## 2024-10-09 DIAGNOSIS — R73.01 IMPAIRED FASTING GLUCOSE: ICD-10-CM

## 2024-11-13 ENCOUNTER — RA CDI HCC (OUTPATIENT)
Dept: OTHER | Facility: HOSPITAL | Age: 89
End: 2024-11-13

## 2024-11-15 ENCOUNTER — APPOINTMENT (OUTPATIENT)
Dept: LAB | Age: 89
End: 2024-11-15
Payer: COMMERCIAL

## 2024-11-15 DIAGNOSIS — E78.2 MIXED HYPERLIPIDEMIA: ICD-10-CM

## 2024-11-15 DIAGNOSIS — R73.01 IMPAIRED FASTING GLUCOSE: ICD-10-CM

## 2024-11-15 DIAGNOSIS — E55.9 VITAMIN D DEFICIENCY DISEASE: ICD-10-CM

## 2024-11-15 LAB
25(OH)D3 SERPL-MCNC: 78 NG/ML (ref 30–100)
ALBUMIN SERPL BCG-MCNC: 4.1 G/DL (ref 3.5–5)
ALP SERPL-CCNC: 81 U/L (ref 34–104)
ALT SERPL W P-5'-P-CCNC: 12 U/L (ref 7–52)
ANION GAP SERPL CALCULATED.3IONS-SCNC: 10 MMOL/L (ref 4–13)
AST SERPL W P-5'-P-CCNC: 19 U/L (ref 13–39)
BASOPHILS # BLD AUTO: 0.04 THOUSANDS/ÂΜL (ref 0–0.1)
BASOPHILS NFR BLD AUTO: 1 % (ref 0–1)
BILIRUB SERPL-MCNC: 0.59 MG/DL (ref 0.2–1)
BUN SERPL-MCNC: 11 MG/DL (ref 5–25)
CALCIUM SERPL-MCNC: 9.1 MG/DL (ref 8.4–10.2)
CHLORIDE SERPL-SCNC: 103 MMOL/L (ref 96–108)
CHOLEST SERPL-MCNC: 249 MG/DL (ref ?–200)
CO2 SERPL-SCNC: 27 MMOL/L (ref 21–32)
CREAT SERPL-MCNC: 0.7 MG/DL (ref 0.6–1.3)
EOSINOPHIL # BLD AUTO: 0.16 THOUSAND/ÂΜL (ref 0–0.61)
EOSINOPHIL NFR BLD AUTO: 3 % (ref 0–6)
ERYTHROCYTE [DISTWIDTH] IN BLOOD BY AUTOMATED COUNT: 13.3 % (ref 11.6–15.1)
EST. AVERAGE GLUCOSE BLD GHB EST-MCNC: 114 MG/DL
GFR SERPL CREATININE-BSD FRML MDRD: 78 ML/MIN/1.73SQ M
GLUCOSE P FAST SERPL-MCNC: 105 MG/DL (ref 65–99)
HBA1C MFR BLD: 5.6 %
HCT VFR BLD AUTO: 45.2 % (ref 34.8–46.1)
HDLC SERPL-MCNC: 73 MG/DL
HGB BLD-MCNC: 14.6 G/DL (ref 11.5–15.4)
IMM GRANULOCYTES # BLD AUTO: 0.02 THOUSAND/UL (ref 0–0.2)
IMM GRANULOCYTES NFR BLD AUTO: 0 % (ref 0–2)
LDLC SERPL CALC-MCNC: 153 MG/DL (ref 0–100)
LYMPHOCYTES # BLD AUTO: 1.87 THOUSANDS/ÂΜL (ref 0.6–4.47)
LYMPHOCYTES NFR BLD AUTO: 29 % (ref 14–44)
MCH RBC QN AUTO: 29.3 PG (ref 26.8–34.3)
MCHC RBC AUTO-ENTMCNC: 32.3 G/DL (ref 31.4–37.4)
MCV RBC AUTO: 91 FL (ref 82–98)
MONOCYTES # BLD AUTO: 0.42 THOUSAND/ÂΜL (ref 0.17–1.22)
MONOCYTES NFR BLD AUTO: 7 % (ref 4–12)
NEUTROPHILS # BLD AUTO: 4 THOUSANDS/ÂΜL (ref 1.85–7.62)
NEUTS SEG NFR BLD AUTO: 60 % (ref 43–75)
NRBC BLD AUTO-RTO: 0 /100 WBCS
PLATELET # BLD AUTO: 398 THOUSANDS/UL (ref 149–390)
PMV BLD AUTO: 9.4 FL (ref 8.9–12.7)
POTASSIUM SERPL-SCNC: 3.6 MMOL/L (ref 3.5–5.3)
PROT SERPL-MCNC: 7.2 G/DL (ref 6.4–8.4)
RBC # BLD AUTO: 4.98 MILLION/UL (ref 3.81–5.12)
SODIUM SERPL-SCNC: 140 MMOL/L (ref 135–147)
TRIGL SERPL-MCNC: 113 MG/DL (ref ?–150)
TSH SERPL DL<=0.05 MIU/L-ACNC: 2.39 UIU/ML (ref 0.45–4.5)
WBC # BLD AUTO: 6.51 THOUSAND/UL (ref 4.31–10.16)

## 2024-11-15 PROCEDURE — 83036 HEMOGLOBIN GLYCOSYLATED A1C: CPT

## 2024-11-15 PROCEDURE — 82306 VITAMIN D 25 HYDROXY: CPT

## 2024-11-15 PROCEDURE — 84443 ASSAY THYROID STIM HORMONE: CPT

## 2024-11-15 PROCEDURE — 80061 LIPID PANEL: CPT

## 2024-11-15 PROCEDURE — 80053 COMPREHEN METABOLIC PANEL: CPT

## 2024-11-15 PROCEDURE — 85025 COMPLETE CBC W/AUTO DIFF WBC: CPT

## 2024-11-15 PROCEDURE — 36415 COLL VENOUS BLD VENIPUNCTURE: CPT

## 2024-11-19 ENCOUNTER — OFFICE VISIT (OUTPATIENT)
Dept: INTERNAL MEDICINE CLINIC | Facility: CLINIC | Age: 89
End: 2024-11-19
Payer: COMMERCIAL

## 2024-11-19 VITALS
SYSTOLIC BLOOD PRESSURE: 140 MMHG | WEIGHT: 142 LBS | HEIGHT: 64 IN | HEART RATE: 91 BPM | BODY MASS INDEX: 24.24 KG/M2 | TEMPERATURE: 97.8 F | DIASTOLIC BLOOD PRESSURE: 76 MMHG | OXYGEN SATURATION: 99 %

## 2024-11-19 DIAGNOSIS — Z00.00 MEDICARE ANNUAL WELLNESS VISIT, SUBSEQUENT: ICD-10-CM

## 2024-11-19 DIAGNOSIS — J01.00 ACUTE NON-RECURRENT MAXILLARY SINUSITIS: ICD-10-CM

## 2024-11-19 DIAGNOSIS — J45.20 MILD INTERMITTENT ASTHMA WITHOUT COMPLICATION: ICD-10-CM

## 2024-11-19 DIAGNOSIS — E55.9 VITAMIN D DEFICIENCY DISEASE: ICD-10-CM

## 2024-11-19 DIAGNOSIS — Z12.11 SCREENING FOR COLON CANCER: ICD-10-CM

## 2024-11-19 DIAGNOSIS — R73.01 IMPAIRED FASTING GLUCOSE: ICD-10-CM

## 2024-11-19 DIAGNOSIS — R42 DIZZINESS: ICD-10-CM

## 2024-11-19 DIAGNOSIS — Z23 ENCOUNTER FOR IMMUNIZATION: ICD-10-CM

## 2024-11-19 DIAGNOSIS — E78.2 MIXED HYPERLIPIDEMIA: Primary | ICD-10-CM

## 2024-11-19 PROCEDURE — G0439 PPPS, SUBSEQ VISIT: HCPCS | Performed by: INTERNAL MEDICINE

## 2024-11-19 PROCEDURE — G0008 ADMIN INFLUENZA VIRUS VAC: HCPCS

## 2024-11-19 PROCEDURE — 90662 IIV NO PRSV INCREASED AG IM: CPT

## 2024-11-19 PROCEDURE — 99214 OFFICE O/P EST MOD 30 MIN: CPT | Performed by: INTERNAL MEDICINE

## 2024-11-19 RX ORDER — ERGOCALCIFEROL 1.25 MG/1
50000 CAPSULE, LIQUID FILLED ORAL WEEKLY
Qty: 13 CAPSULE | Refills: 1 | Status: SHIPPED | OUTPATIENT
Start: 2024-11-19

## 2024-11-19 RX ORDER — MECLIZINE HYDROCHLORIDE 25 MG/1
25 TABLET ORAL EVERY 8 HOURS PRN
Qty: 30 TABLET | Refills: 0 | Status: SHIPPED | OUTPATIENT
Start: 2024-11-19

## 2024-11-19 RX ORDER — AZITHROMYCIN 500 MG/1
500 TABLET, FILM COATED ORAL DAILY
Qty: 3 TABLET | Refills: 1 | Status: SHIPPED | OUTPATIENT
Start: 2024-11-19 | End: 2024-11-22

## 2024-11-19 NOTE — ASSESSMENT & PLAN NOTE
Orders:    meclizine (ANTIVERT) 25 mg tablet; Take 1 tablet (25 mg total) by mouth every 8 (eight) hours as needed for dizziness

## 2024-11-19 NOTE — PROGRESS NOTES
Name: Ceci Galeas      : 1935      MRN: [id number]  Encounter Provider: Iftikhar Shankar MD  Encounter Date: 2024   Encounter department: [facility] INTERNAL MEDICINE    Assessment & Plan  Mixed hyperlipidemia         Vitamin D deficiency disease    Orders:    ergocalciferol (VITAMIN D2) 50,000 units; Take 1 capsule (50,000 Units total) by mouth once a week    Impaired fasting glucose         Mild intermittent asthma without complication         Screening for colon cancer    Orders:    Ambulatory Referral to Gastroenterology; Future    Dizziness    Orders:    meclizine (ANTIVERT) 25 mg tablet; Take 1 tablet (25 mg total) by mouth every 8 (eight) hours as needed for dizziness    Acute non-recurrent maxillary sinusitis    Orders:    azithromycin (ZITHROMAX) 500 MG tablet; Take 1 tablet (500 mg total) by mouth daily for 3 days    Medicare annual wellness visit, subsequent            Preventive health issues were discussed with patient, and age appropriate screening tests were ordered as noted in patient's After Visit Summary. Personalized health advice and appropriate referrals for health education or preventive services given if needed, as noted in patient's After Visit Summary.    History of Present Illness     Follow-up on blood test done on 11/15/2024 test discussed with her, also Medicare wellness exam       Patient Care Team:  Iftikhar Shankar MD as PCP - General (Internal Medicine)    Review of Systems   Constitutional:  Negative for chills and fever.   HENT:  Positive for congestion. Negative for ear pain and sore throat.    Eyes:  Negative for pain.   Respiratory:  Positive for cough. Negative for shortness of breath.    Cardiovascular:  Negative for chest pain and leg swelling.   Gastrointestinal:  Negative for abdominal pain, nausea and vomiting.   Endocrine: Negative for polyuria.   Genitourinary:  Negative for difficulty urinating, frequency and urgency.   Musculoskeletal:  Negative for  arthralgias and back pain.   Skin:  Negative for rash.   Neurological:  Negative for weakness and headaches.   Psychiatric/Behavioral:  Negative for sleep disturbance. The patient is not nervous/anxious.      Medical History Reviewed by provider this encounter:  Tobacco  Allergies  Meds  Problems  Med Hx  Surg Hx  Fam Hx       Annual Wellness Visit Questionnaire   Ceci is here for her Subsequent Wellness visit. Last Medicare Wellness visit information reviewed, patient interviewed and updates made to the record today.      Health Risk Assessment:   Patient rates overall health as very good. Patient feels that their physical health rating is same. Patient is satisfied with their life. Eyesight was rated as same. Hearing was rated as same. Patient feels that their emotional and mental health rating is same. Patients states they are never, rarely angry. Patient states they are sometimes unusually tired/fatigued. Pain experienced in the last 7 days has been a lot. Patient's pain rating has been 8/10. Patient states that she has experienced no weight loss or gain in last 6 months.     Depression Screening:   PHQ-9 Score: 1      Fall Risk Screening:   In the past year, patient has experienced: no history of falling in past year      Urinary Incontinence Screening:   Patient has not leaked urine accidently in the last six months.     Home Safety:  Patient does not have trouble with stairs inside or outside of their home. Patient has working smoke alarms and has no working carbon monoxide detector. Home safety hazards include: none.     Nutrition:   Current diet is Regular.     Medications:   Patient is currently taking over-the-counter supplements. OTC medications include: see medication list. Patient is able to manage medications.     Activities of Daily Living (ADLs)/Instrumental Activities of Daily Living (IADLs):   Walk and transfer into and out of bed and chair?: Yes  Dress and groom yourself?: Yes    Bathe or  shower yourself?: Yes    Feed yourself? Yes  Do your laundry/housekeeping?: Yes  Manage your money, pay your bills and track your expenses?: Yes  Make your own meals?: Yes    Do your own shopping?: Yes    Previous Hospitalizations:   Any hospitalizations or ED visits within the last 12 months?: No      Advance Care Planning:   Living will: Yes    Durable POA for healthcare: Yes    Advanced directive: Yes      PREVENTIVE SCREENINGS      Cardiovascular Screening:    General: Screening Not Indicated and History Lipid Disorder      Diabetes Screening:     General: Screening Current      Colorectal Cancer Screening:     General: Screening Not Indicated      Cervical Cancer Screening:    General: Screening Not Indicated      Osteoporosis Screening:    General: Screening Not Indicated and History Osteoporosis      Lung Cancer Screening:     General: Screening Not Indicated    Screening, Brief Intervention, and Referral to Treatment (SBIRT)    Screening  Typical number of drinks in a day: 0  Typical number of drinks in a week: 0  Interpretation: Low risk drinking behavior.    Single Item Drug Screening:  How often have you used an illegal drug (including marijuana) or a prescription medication for non-medical reasons in the past year? never    Single Item Drug Screen Score: 0  Interpretation: Negative screen for possible drug use disorder    Social Drivers of Health     Financial Resource Strain: Low Risk  (11/14/2023)    Overall Financial Resource Strain (CARDIA)     Difficulty of Paying Living Expenses: Not hard at all   Food Insecurity: No Food Insecurity (11/19/2024)    Hunger Vital Sign     Worried About Running Out of Food in the Last Year: Never true     Ran Out of Food in the Last Year: Never true   Transportation Needs: No Transportation Needs (11/19/2024)    PRAPARE - Transportation     Lack of Transportation (Medical): No     Lack of Transportation (Non-Medical): No   Housing Stability: Low Risk  (11/19/2024)     "Housing Stability Vital Sign     Unable to Pay for Housing in the Last Year: No     Number of Times Moved in the Last Year: 0     Homeless in the Last Year: No   Utilities: Not At Risk (11/19/2024)    [facility] Utilities     Threatened with loss of utilities: No     No results found.    Objective   /76 (BP Location: Left arm, Patient Position: Sitting, Cuff Size: Standard)   Pulse 91   Temp 97.8 °F (36.6 °C) (Temporal)   Ht 5' 4\" (1.626 m)   Wt 64.4 kg (142 lb)   SpO2 99%   BMI 24.37 kg/m²     Physical Exam  Vitals and nursing note reviewed.   Constitutional:       General: She is not in acute distress.     Appearance: She is well-developed.   HENT:      Head: Normocephalic and atraumatic.      Right Ear: Tympanic membrane, ear canal and external ear normal.      Left Ear: Tympanic membrane, ear canal and external ear normal.      Mouth/Throat:      Mouth: Mucous membranes are moist.      Pharynx: Oropharynx is clear.   Eyes:      Extraocular Movements: Extraocular movements intact.      Conjunctiva/sclera: Conjunctivae normal.   Cardiovascular:      Rate and Rhythm: Normal rate and regular rhythm.      Heart sounds: Normal heart sounds. No murmur heard.  Pulmonary:      Effort: Pulmonary effort is normal. No respiratory distress.      Breath sounds: Normal breath sounds. No wheezing or rales.   Abdominal:      General: Abdomen is flat. There is no distension.      Palpations: Abdomen is soft.      Tenderness: There is no abdominal tenderness.   Musculoskeletal:         General: No swelling.      Cervical back: Neck supple.      Right lower leg: No edema.      Left lower leg: No edema.   Skin:     General: Skin is warm and dry.      Capillary Refill: Capillary refill takes less than 2 seconds.   Neurological:      General: No focal deficit present.      Mental Status: She is alert and oriented to person, place, and time.   Psychiatric:         Mood and Affect: Mood normal.         "

## 2024-12-04 DIAGNOSIS — E55.9 VITAMIN D DEFICIENCY DISEASE: ICD-10-CM

## 2024-12-05 RX ORDER — ERGOCALCIFEROL 1.25 MG/1
50000 CAPSULE, LIQUID FILLED ORAL WEEKLY
Qty: 13 CAPSULE | Refills: 1 | Status: SHIPPED | OUTPATIENT
Start: 2024-12-05

## 2025-03-28 ENCOUNTER — TELEPHONE (OUTPATIENT)
Age: 89
End: 2025-03-28

## 2025-03-28 NOTE — TELEPHONE ENCOUNTER
Patient returning call stating she will not be undergoing any colonoscopy or EGD ever again.  She is doing well with no issue's what so ever.

## 2025-04-21 ENCOUNTER — TELEPHONE (OUTPATIENT)
Age: 89
End: 2025-04-21

## 2025-04-21 DIAGNOSIS — E53.8 B12 DEFICIENCY: ICD-10-CM

## 2025-04-21 DIAGNOSIS — E55.9 VITAMIN D DEFICIENCY DISEASE: Primary | ICD-10-CM

## 2025-04-21 DIAGNOSIS — E78.2 MIXED HYPERLIPIDEMIA: ICD-10-CM

## 2025-04-21 DIAGNOSIS — R06.02 SOB (SHORTNESS OF BREATH): ICD-10-CM

## 2025-04-21 DIAGNOSIS — R73.01 IMPAIRED FASTING GLUCOSE: ICD-10-CM

## 2025-04-21 NOTE — TELEPHONE ENCOUNTER
Pt called asking iff Dr Shankar could order her Labs for her upcoming 6 mon fu visit. She's also asking for chest xray   Stated Dr Shankar is aware of her breathing problem. Her chest does not feel good. Please advise

## 2025-04-28 ENCOUNTER — OFFICE VISIT (OUTPATIENT)
Dept: INTERNAL MEDICINE CLINIC | Facility: CLINIC | Age: 89
End: 2025-04-28
Payer: COMMERCIAL

## 2025-04-28 VITALS
WEIGHT: 139 LBS | HEART RATE: 96 BPM | DIASTOLIC BLOOD PRESSURE: 80 MMHG | BODY MASS INDEX: 23.73 KG/M2 | OXYGEN SATURATION: 96 % | TEMPERATURE: 98.9 F | SYSTOLIC BLOOD PRESSURE: 136 MMHG | HEIGHT: 64 IN

## 2025-04-28 DIAGNOSIS — M54.50 LOW BACK PAIN AT MULTIPLE SITES: Primary | ICD-10-CM

## 2025-04-28 DIAGNOSIS — R42 DIZZINESS: ICD-10-CM

## 2025-04-28 DIAGNOSIS — E78.2 MIXED HYPERLIPIDEMIA: ICD-10-CM

## 2025-04-28 DIAGNOSIS — E55.9 VITAMIN D DEFICIENCY DISEASE: ICD-10-CM

## 2025-04-28 DIAGNOSIS — J01.00 ACUTE NON-RECURRENT MAXILLARY SINUSITIS: ICD-10-CM

## 2025-04-28 PROCEDURE — G2211 COMPLEX E/M VISIT ADD ON: HCPCS | Performed by: INTERNAL MEDICINE

## 2025-04-28 PROCEDURE — 99214 OFFICE O/P EST MOD 30 MIN: CPT | Performed by: INTERNAL MEDICINE

## 2025-04-28 RX ORDER — NAPROXEN 500 MG/1
500 TABLET ORAL 2 TIMES DAILY WITH MEALS
Qty: 20 TABLET | Refills: 0 | Status: SHIPPED | OUTPATIENT
Start: 2025-04-28

## 2025-04-28 RX ORDER — AMOXICILLIN 500 MG/1
500 TABLET, FILM COATED ORAL 3 TIMES DAILY
Qty: 21 TABLET | Refills: 0 | Status: SHIPPED | OUTPATIENT
Start: 2025-04-28 | End: 2025-05-05

## 2025-04-28 NOTE — PROGRESS NOTES
"Name: Ceci Galeas      : 1935      MRN: [id number]  Encounter Provider: Iftikhar Shankar MD  Encounter Date: 2025   Encounter department: [facility] INTERNAL MEDICINE [address]  :  Assessment & Plan  Low back pain at multiple sites    Orders:  •  naproxen (Naprosyn) 500 mg tablet; Take 1 tablet (500 mg total) by mouth 2 (two) times a day with meals    Acute non-recurrent maxillary sinusitis    Orders:  •  amoxicillin (AMOXIL) 500 MG tablet; Take 1 tablet (500 mg total) by mouth 3 (three) times a day for 7 days    Dizziness  continue same med        Vitamin D deficiency disease  continue same med        Mixed hyperlipidemia  Diet and exercise discussed              History of Present Illness   Cough, sinus pressure, also follow-up on other medical issue to ensure they are stable      Review of Systems   Constitutional:  Negative for chills and fever.   HENT:  Negative for congestion, ear pain and sore throat.    Eyes:  Negative for pain.   Respiratory:  Positive for cough. Negative for shortness of breath.    Cardiovascular:  Negative for chest pain and leg swelling.   Gastrointestinal:  Negative for abdominal pain, nausea and vomiting.   Endocrine: Negative for polyuria.   Genitourinary:  Negative for difficulty urinating, frequency and urgency.   Musculoskeletal:  Positive for back pain. Negative for arthralgias.   Skin:  Negative for rash.   Neurological:  Negative for weakness and headaches.   Psychiatric/Behavioral:  Negative for sleep disturbance. The patient is not nervous/anxious.        Objective   /80 (BP Location: Left arm, Patient Position: Sitting, Cuff Size: Standard)   Pulse 96   Temp 98.9 °F (37.2 °C) (Temporal)   Ht 5' 4\" (1.626 m)   Wt 63 kg (139 lb)   SpO2 96%   BMI 23.86 kg/m²      Physical Exam  Vitals and nursing note reviewed.   Constitutional:       General: She is not in acute distress.     Appearance: She is well-developed.   HENT:      Head: Normocephalic " and atraumatic.      Right Ear: Tympanic membrane, ear canal and external ear normal.      Left Ear: Tympanic membrane, ear canal and external ear normal.      Mouth/Throat:      Mouth: Mucous membranes are moist.      Pharynx: Oropharynx is clear.   Eyes:      Extraocular Movements: Extraocular movements intact.      Conjunctiva/sclera: Conjunctivae normal.   Cardiovascular:      Rate and Rhythm: Normal rate and regular rhythm.      Heart sounds: Normal heart sounds. No murmur heard.  Pulmonary:      Effort: Pulmonary effort is normal. No respiratory distress.      Breath sounds: Normal breath sounds. No wheezing or rales.   Abdominal:      General: Abdomen is flat. There is no distension.      Palpations: Abdomen is soft.      Tenderness: There is no abdominal tenderness.   Musculoskeletal:         General: No swelling.      Cervical back: Neck supple.      Right lower leg: No edema.      Left lower leg: No edema.   Skin:     General: Skin is warm and dry.      Capillary Refill: Capillary refill takes less than 2 seconds.   Neurological:      General: No focal deficit present.      Mental Status: She is alert and oriented to person, place, and time.   Psychiatric:         Mood and Affect: Mood normal.

## 2025-05-05 ENCOUNTER — TELEPHONE (OUTPATIENT)
Age: 89
End: 2025-05-05

## 2025-05-05 DIAGNOSIS — M54.50 LOW BACK PAIN AT MULTIPLE SITES: ICD-10-CM

## 2025-05-05 RX ORDER — IBUPROFEN 600 MG/1
600 TABLET, FILM COATED ORAL EVERY 8 HOURS PRN
Qty: 30 TABLET | Refills: 0 | Status: SHIPPED | OUTPATIENT
Start: 2025-05-05

## 2025-05-05 NOTE — TELEPHONE ENCOUNTER
Pt is requesting Dr Shankar call in Ibuprofen to [facility]. Pt can not take  naproxen (Naprosyn) 500 mg tablet it hurts her stomach.

## 2025-05-21 DIAGNOSIS — E55.9 VITAMIN D DEFICIENCY DISEASE: ICD-10-CM

## 2025-05-21 RX ORDER — ERGOCALCIFEROL 1.25 MG/1
50000 CAPSULE, LIQUID FILLED ORAL WEEKLY
Qty: 13 CAPSULE | Refills: 1 | Status: SHIPPED | OUTPATIENT
Start: 2025-05-21

## (undated) RX ORDER — BROMFENAC SODIUM 0.7 MG/ML: 1 SOLUTION/ DROPS OPHTHALMIC ONCE A DAY